# Patient Record
Sex: MALE | Race: WHITE | HISPANIC OR LATINO | Employment: UNEMPLOYED | URBAN - METROPOLITAN AREA
[De-identification: names, ages, dates, MRNs, and addresses within clinical notes are randomized per-mention and may not be internally consistent; named-entity substitution may affect disease eponyms.]

---

## 2018-07-19 ENCOUNTER — TRANSFERRED RECORDS (OUTPATIENT)
Dept: HEALTH INFORMATION MANAGEMENT | Facility: CLINIC | Age: 48
End: 2018-07-19

## 2018-08-22 ENCOUNTER — TRANSFERRED RECORDS (OUTPATIENT)
Dept: HEALTH INFORMATION MANAGEMENT | Facility: CLINIC | Age: 48
End: 2018-08-22

## 2018-08-28 ENCOUNTER — REFERRAL (OUTPATIENT)
Dept: TRANSPLANT | Facility: CLINIC | Age: 48
End: 2018-08-28

## 2018-08-28 NOTE — LETTER
Santos Galloway  [address] [address]    Dear Santos,    Thank you for your interest in the Transplant Center at [facility], [facility]. We look forward to being a part of your care team and assisting you through the transplant process.    As we discussed, your transplant coordinator is Shania Cuevas (Pancreas, Kidney).  You may call your coordinator at any time with questions or concerns.  Your first scheduled call will be on 11/19/18.  If this needs to change, call [phone].    Please complete the following.    1. Fill out and return the enclosed forms    Authorization for Electronic Communication    Authorization to Discuss Protected Health Information    Authorization for Release of Protected Health Information    2. Sign up for:    [employer], access to your electronic medical record (see enclosed pamphlet)    You can use these tools to learn more about your transplant, communicate with your care team, and track your medical details      Sincerely,    Solid Organ Transplant  [facility], [facility]

## 2018-11-07 VITALS — WEIGHT: 135 LBS | BODY MASS INDEX: 23.92 KG/M2 | HEIGHT: 63 IN

## 2018-11-07 NOTE — TELEPHONE ENCOUNTER
Insurance information:  Medical Assistance   Policy wagner:  Self  Subscriber/policy/ID number:  [id number]  Group Number:  [id number]    Is patient in a group home/assisted living? No   Does patient have a guardian? No    Referral intake process completed.  Patient is aware that after financial approval is received, medical records will be requested.   Patient confirmed for a callback from transplant coordinator on 11/19/2018  Tentative evaluation date NA.  Confirmed coordinator will discuss evaluation process in more detail at the time of their call.   Patient is aware of the need to arrange age appropriate cancer screening, vaccinations, and dental care.  Reminded patient to complete questionnaire, complete medical records release, and review packet prior to evaluation visit .  Assessed patient for special needs (ie--wheelchair, assistance, guardian, and ):  No  Patient instructed to call [phone] with questions.     JORDAN Jacob, LPN   Solid Organ Transplant

## 2018-11-19 ENCOUNTER — TELEPHONE (OUTPATIENT)
Dept: TRANSPLANT | Facility: CLINIC | Age: 48
End: 2018-11-19

## 2018-11-19 DIAGNOSIS — N18.9 CHRONIC RENAL FAILURE: ICD-10-CM

## 2018-11-19 DIAGNOSIS — Z76.82 ORGAN TRANSPLANT CANDIDATE: ICD-10-CM

## 2018-11-19 DIAGNOSIS — E11.9 DIABETES MELLITUS, TYPE 2 (H): ICD-10-CM

## 2018-11-19 DIAGNOSIS — I10 ESSENTIAL HYPERTENSION: ICD-10-CM

## 2018-11-19 NOTE — TELEPHONE ENCOUNTER
I placed a call to Santos to respond to his referral to our kidney transplant program.  His records indicate that he is Type 2 diabetic on insulin but Santos reports that he hasn't needed to take insulin for 2 years.  I informed him that he will likely need to take insulin again after transplant.  He thinks his diabetes was diagnosed at age 31-32.  He also has hypertension; has never been biopsied.  GFR in the 14 range and he states he is very fatigued and anemic, not on dialysis.  He states he is now disabled from his job.  No surgical history.  He states his 18 yr old son may want to be his donor.  I described the typical evaluation process.  He says his wife will likely come with him to evaluation.  He was unable to take down my name and number at this time. Orders to  for 1st day of evaluation.    Health maintenance:  He indicates he had coloscopy in 2017 but he can't recall why he had it (I do not see a report in [facility]).  Vaccinations; does not think he has had either Pneumovax or Hep B vaccines.  Dental; he states he hasn't seen a dentist in a very long time so I advised him to get exam/cleaning/work done.  He says this will likely be a financial hardship.

## 2018-11-20 ENCOUNTER — TRANSFERRED RECORDS (OUTPATIENT)
Dept: HEALTH INFORMATION MANAGEMENT | Facility: CLINIC | Age: 48
End: 2018-11-20

## 2018-11-20 NOTE — TELEPHONE ENCOUNTER
Called patient and offered dates next week last week of November and the 1st week of December, he choose Wed, Dec 5 slot 3 w/Rupa/Jonatan.....UPSing out to home today.

## 2018-12-03 NOTE — TELEPHONE ENCOUNTER
Patient called this morning asking to move his kidney eval to Friday, Dec 14 from Wed, Dec 5.  I told him we only do kidney evals on Mon, Wed & Thurs.  He was taking to someone else in Polish, he than asked for a Monday.  First available opening (with a non KP surgeon) is Mon, Jan 14, he talked again to the other party with him and finally confirmed for Mon, Jan 14.  I told patient I would be rescheduling his eval and mailing a revised schedule to his home, he confirmed.

## 2019-01-10 NOTE — PROGRESS NOTES
Assessment and Plan:  # Kidney transplant evaluation - patient is a good candidate overall. Benefits of a living donor transplant were discussed.  # CKD stage 5 from diabetes mellitus type 2 and hypertension - current eGFR 14-16 ml/min. He has uremic symptoms and is nearing the need for renal replacement therapy and would benefit from a kidney transplant.   # Type 2 diabetes - currently controlled without oral agents or insulin (insulin dc'd a few years ago). We discussed the potential need for insulin post-kidney transplant.   # CAD - no history of cardiac disease, but patient will be seen by Cardiology prior to transplant.  # PAD - mentioned on outside CT scans of the abdomen/pelvis dated 5+ years ago. Will repeat non-contrast abdomen/pelvic CT and order iliofemoral US with dopplers.   # Microscopic hematuria - 6-10 RBC's on outside urinalysis. Given age and lack of smoking history, more likely that this is related to his underlying diabetic nephropathy than bladder cancer. Repeat UA pending.   # Anxiety - currently taking sertraline and managed by his PCP. Appreciate social work in put.   # Health maintenance - due for dental.    Discussed the risks and benefits of a transplant, including the risk of surgery and immunosuppression medications.  Patient's overall evaluation will be discussed in the Transplant Program's regular meeting with a final recommendation on the patient's suitability for transplant to be made at that time.  Patient was seen in conjunction with Dr. Bert Garcia as part of a shared visit.    Patient was seen by myself, Dr. Bert Garcia, in conjunction with Khadijah Momin PA-C as part of a shared visit.    I personally reviewed past medical and surgical history, vital signs, medications and labs.  Present and past medical history, along with significant physical exam findings were all reviewed with DIAMANTE.    My lorenzo findings:  Santos Galloway is a 48 year old year old male with CKD from diabetes  mellitus type 2, who presents for kidney transplant evaluation.  Patient reports feeling okay overall, but with some medical complaints.    Key management decisions made by me and discussed with DIAMANTE:  # Kidney transplant evaluation: patient is a good candidate overall. Benefits of a living donor transplant were discussed.  # CKD from diabetes mellitus type 2: Patient has had a progressive decline in kidney function and is nearing need for renal replacement therapy and would benefit from a kidney transplant.  Preferably, a preemptive living donor kidney transplant.  # DM type 2: Good control off all medications at this time.  # Cardiac Risk: Patient will require Cardiology evaluation prior to transplant.  # PAD: Some atherosclerotic findings on outside imaging, but patient is asymptomatic.  Will obtain further imaging and discuss with Transplant Surgery.  # Anxiety: Appears stable, but appreciate  input.    Evaluation:  Santos Galloway was seen in consultation at the request of Dr. Geo Cheung for evaluation as a potential kidney transplant recipient.    Reason for Visit:  Santos Galloway is a 48-year-old male with CKD from diabetes mellitus type 2 and hypertension, who presents for kidney transplant evaluation.    HPI:  Mr. Galloway is a 48-year-old  male with CKD from presumed diabetes and hypertension. He was diagnosed with type 2 diabetes approximately 10-15 years ago that has also been complicated by gastroparesis, retinopathy, and peripheral neuropathy. He was previously on insulin (initiated in mid-2013), which was discontinued approximately 2 years ago (creatinine at time of discontinuation around 1.9-2.0 mg/dl, A1c's since around 5%). He is no longer checking blood sugars. He denies history of hypoglycemic unawareness. He began following with nephrology about 3 years ago when he was found to have serum creatinine in the mid-1's mg/dl, as well as proteinuria and microscopic hematuria. Serologic  work up was negative. Renal imaging showed echogenic kidneys. He has not had a kidney biopsy. He has continued to have a progressive decline in kidney function (creatinine 1.5-1.7 mg/dl [id number], 1.9-2.0 mg/dl November 2016 to May 2017, 3.3 mg/dl May 2018, now low-to-mid 4's mg/dl since July 2018). He had an AV fistula placed, but is not yet on dialysis.     He has no known history of cardiac disease or events, but does report a strong family history of coronary artery disease. In fact, his uncle is undergoing CABG today. He was previously physically active at work, but with uremic symptoms, he has had to stop working. He can walk several blocks and denies chest pain, SOB, or claudication symptoms.     Overall, he is feeling poorly. His energy level and appetite have been reduced over the past few months. He has intermittent nausea and vomiting. He estimates he has lost approximately 10 lbs due to decreased appetite. No diarrhea. He hasn't noticed any changes in urination, or dysuria, hematuria, or trouble emptying his bladder. No fever, sweats, or recent illness.         Kidney Disease Hx:        Kidney Disease Dx: DM/HTN       Biopsy Proven: No         On Dialysis: No       Primary Nephrologist: Dr. Moses         [facility] Hx:       h/o HTN: Yes         h/o DM:  Yes        h/o Protein in Urine: Yes        h/o Blood in Urine:  Yes        h/o Kidney Stones:  No       h/o UTI: No       h/o Chronic NSAID Use: No         Previous Transplant Hx:        No         Transplant Sensitization Hx:       Previous Tx: No       Blood Transfusion: No         Uremic Symptoms:       Fatigue: Yes; Cold: No; Nausea: Yes; Poor Appetite: Yes; Metallic Taste: No; Edema: No;         Cardiovascular Hx:       h/o Cardiac Issues: No       Exercise Tolerance: no chest pain or shortness of breath with exertion.         Health Maintenance:       Dental: Not up to date         Potential Donor(s): son     SANJU:  A comprehensive review of systems  "was obtained and negative, except as noted in the HPI or PMH.    PMH:   Medical record was reviewed and PMH was discussed with patient and noted below.  Past Medical History:   Diagnosis Date     Anxiety      Cataract      CKD (chronic kidney disease) stage 5, GFR less than 15 ml/min (H)      Gastroparesis      GERD (gastroesophageal reflux disease)      Glaucoma      Hypertension      Peripheral arterial disease (H)      Type 2 diabetes mellitus (H)        PSH:   Past Surgical History:   Procedure Laterality Date     CIRCUMCISION       CREATE FISTULA ARTERIOVENOUS UPPER EXTREMITY       Personal or family history of bleeding or anesthesia problems: No    Family Hx:  Family History   Problem Relation Age of Onset     Diabetes Mother      Kidney Disease Mother      Diabetes Maternal Grandmother      Diabetes Maternal Grandfather      Diabetes Paternal Grandmother      Coronary Artery Disease Maternal Uncle        Personal Hx:   Social History     Socioeconomic History     Marital status:      Spouse name: Not on file     Number of children: Not on file     Years of education: Not on file     Highest education level: Not on file   Social Needs     Financial resource strain: Not on file     Food insecurity - worry: Not on file     Food insecurity - inability: Not on file     Transportation needs - medical: Not on file     Transportation needs - non-medical: Not on file   Occupational History     Not on file   Tobacco Use     Smoking status: Never Smoker     Smokeless tobacco: Never Used   Substance and Sexual Activity     Alcohol use: No     Drug use: No     Sexual activity: Not on file   Other Topics Concern     Parent/sibling w/ CABG, MI or angioplasty before 65F 55M? Not Asked   Social History Narrative     Not on file       Allergies:  No Known Allergies    Medications:  Prior to Admission medications    Not on File       Vitals:  /66   Pulse 87   Temp 98.3  F (36.8  C) (Oral)   Ht 1.605 m (5' 3.18\") "   Wt 67.5 kg (148 lb 12.8 oz)   SpO2 99%   BMI 26.21 kg/m      Exam:  GENERAL APPEARANCE: alert and no distress  HENT: mouth without ulcers or lesions. Poor dentition  LYMPHATICS: no cervical or supraclavicular nodes  RESP: lungs clear to auscultation - no rales, rhonchi or wheezes  CV: regular rhythm, normal rate, no rub, no murmur  EDEMA: no LE edema bilaterally   ABDOMEN: soft, nondistended, nontender  MS: extremities normal - no gross deformities noted, no evidence of inflammation in joints, no muscle tenderness  SKIN: no rash  Femoral pulses 2+ equal bilaterally     Results:   Recent Results (from the past 336 hour(s))   EKG 12-lead, tracing only [EKG1]    Collection Time: 01/14/19  1:17 PM   Result Value Ref Range    Interpretation ECG Click View Image link to view waveform and result    UA with Microscopic    Collection Time: 01/14/19  1:57 PM   Result Value Ref Range    Color Urine Yellow     Appearance Urine Clear     Glucose Urine 50 (A) NEG^Negative mg/dL    Bilirubin Urine Negative NEG^Negative    Ketones Urine Negative NEG^Negative mg/dL    Specific Gravity Urine 1.010 1.003 - 1.035    Blood Urine Negative NEG^Negative    pH Urine 5.0 5.0 - 7.0 pH    Protein Albumin Urine >499 (A) NEG^Negative mg/dL    Urobilinogen mg/dL 0.0 0.0 - 2.0 mg/dL    Nitrite Urine Negative NEG^Negative    Leukocyte Esterase Urine Negative NEG^Negative    Source Midstream Urine     WBC Urine 1 0 - 5 /HPF    RBC Urine 2 0 - 2 /HPF    Squamous Epithelial /HPF Urine <1 0 - 1 /HPF    Mucous Urine Present (A) NEG^Negative /LPF   Comprehensive metabolic panel [LAB17]    Collection Time: 01/14/19  2:02 PM   Result Value Ref Range    Sodium 139 133 - 144 mmol/L    Potassium 4.3 3.4 - 5.3 mmol/L    Chloride 114 (H) 94 - 109 mmol/L    Carbon Dioxide 18 (L) 20 - 32 mmol/L    Anion Gap 8 3 - 14 mmol/L    Glucose 130 (H) 70 - 99 mg/dL    Urea Nitrogen 55 (H) 7 - 30 mg/dL    Creatinine 5.14 (H) 0.66 - 1.25 mg/dL    GFR Estimate 12 (L) >60  mL/min/[1.73_m2]    GFR Estimate If Black 14 (L) >60 mL/min/[1.73_m2]    Calcium 6.3 (L) 8.5 - 10.1 mg/dL    Bilirubin Total 0.2 0.2 - 1.3 mg/dL    Albumin 2.5 (L) 3.4 - 5.0 g/dL    Protein Total 5.6 (L) 6.8 - 8.8 g/dL    Alkaline Phosphatase 120 40 - 150 U/L    ALT 15 0 - 70 U/L    AST 11 0 - 45 U/L   CBC with platelets differential [CQA970]    Collection Time: 01/14/19  2:02 PM   Result Value Ref Range    WBC 6.7 4.0 - 11.0 10e9/L    RBC Count 3.66 (L) 4.4 - 5.9 10e12/L    Hemoglobin 9.8 (L) 13.3 - 17.7 g/dL    Hematocrit 32.2 (L) 40.0 - 53.0 %    MCV 88 78 - 100 fl    MCH 26.8 26.5 - 33.0 pg    MCHC 30.4 (L) 31.5 - 36.5 g/dL    RDW 14.4 10.0 - 15.0 %    Platelet Count 182 150 - 450 10e9/L    Diff Method Automated Method     % Neutrophils 73.3 %    % Lymphocytes 18.8 %    % Monocytes 4.8 %    % Eosinophils 2.3 %    % Basophils 0.6 %    % Immature Granulocytes 0.2 %    Nucleated RBCs 0 0 /100    Absolute Neutrophil 4.9 1.6 - 8.3 10e9/L    Absolute Lymphocytes 1.3 0.8 - 5.3 10e9/L    Absolute Monocytes 0.3 0.0 - 1.3 10e9/L    Absolute Eosinophils 0.2 0.0 - 0.7 10e9/L    Absolute Basophils 0.0 0.0 - 0.2 10e9/L    Abs Immature Granulocytes 0.0 0 - 0.4 10e9/L    Absolute Nucleated RBC 0.0    INR [OPI6626]    Collection Time: 01/14/19  2:02 PM   Result Value Ref Range    INR 1.03 0.86 - 1.14   Partial thromboplastin time [LAB56]    Collection Time: 01/14/19  2:02 PM   Result Value Ref Range    PTT 28 22 - 37 sec   Hemoglobin A1c [LAB90]    Collection Time: 01/14/19  2:02 PM   Result Value Ref Range    Hemoglobin A1C 5.0 0 - 5.6 %

## 2019-01-14 ENCOUNTER — OFFICE VISIT (OUTPATIENT)
Dept: TRANSPLANT | Facility: CLINIC | Age: 49
End: 2019-01-14
Attending: PHYSICIAN ASSISTANT
Payer: COMMERCIAL

## 2019-01-14 ENCOUNTER — ALLIED HEALTH/NURSE VISIT (OUTPATIENT)
Dept: RESEARCH | Facility: CLINIC | Age: 49
End: 2019-01-14

## 2019-01-14 ENCOUNTER — APPOINTMENT (OUTPATIENT)
Dept: LAB | Facility: CLINIC | Age: 49
End: 2019-01-14
Payer: COMMERCIAL

## 2019-01-14 ENCOUNTER — DOCUMENTATION ONLY (OUTPATIENT)
Dept: TRANSPLANT | Facility: CLINIC | Age: 49
End: 2019-01-14

## 2019-01-14 ENCOUNTER — ANCILLARY PROCEDURE (OUTPATIENT)
Dept: GENERAL RADIOLOGY | Facility: CLINIC | Age: 49
End: 2019-01-14
Attending: PHYSICIAN ASSISTANT
Payer: COMMERCIAL

## 2019-01-14 ENCOUNTER — ANCILLARY PROCEDURE (OUTPATIENT)
Dept: CARDIOLOGY | Facility: CLINIC | Age: 49
End: 2019-01-14
Attending: PHYSICIAN ASSISTANT
Payer: COMMERCIAL

## 2019-01-14 VITALS
OXYGEN SATURATION: 99 % | HEIGHT: 63 IN | WEIGHT: 148.8 LBS | HEART RATE: 87 BPM | SYSTOLIC BLOOD PRESSURE: 132 MMHG | TEMPERATURE: 98.3 F | DIASTOLIC BLOOD PRESSURE: 66 MMHG | BODY MASS INDEX: 26.36 KG/M2

## 2019-01-14 DIAGNOSIS — Z76.82 ORGAN TRANSPLANT CANDIDATE: ICD-10-CM

## 2019-01-14 DIAGNOSIS — E11.9 DIABETES MELLITUS, TYPE 2 (H): ICD-10-CM

## 2019-01-14 DIAGNOSIS — Z00.6 EXAMINATION OF PARTICIPANT IN CLINICAL TRIAL: Primary | ICD-10-CM

## 2019-01-14 DIAGNOSIS — Z76.82 ORGAN TRANSPLANT CANDIDATE: Primary | ICD-10-CM

## 2019-01-14 DIAGNOSIS — I10 ESSENTIAL HYPERTENSION: ICD-10-CM

## 2019-01-14 DIAGNOSIS — N18.9 CHRONIC RENAL FAILURE: ICD-10-CM

## 2019-01-14 DIAGNOSIS — N18.5 CKD (CHRONIC KIDNEY DISEASE) STAGE 5, GFR LESS THAN 15 ML/MIN (H): Primary | ICD-10-CM

## 2019-01-14 DIAGNOSIS — N18.5 CKD (CHRONIC KIDNEY DISEASE) STAGE 5, GFR LESS THAN 15 ML/MIN (H): ICD-10-CM

## 2019-01-14 DIAGNOSIS — N18.5 TYPE 2 DIABETES MELLITUS WITH STAGE 5 CHRONIC KIDNEY DISEASE NOT ON CHRONIC DIALYSIS, WITHOUT LONG-TERM CURRENT USE OF INSULIN (H): ICD-10-CM

## 2019-01-14 DIAGNOSIS — F41.9 ANXIETY: ICD-10-CM

## 2019-01-14 DIAGNOSIS — E11.22 TYPE 2 DIABETES MELLITUS WITH STAGE 5 CHRONIC KIDNEY DISEASE NOT ON CHRONIC DIALYSIS, WITHOUT LONG-TERM CURRENT USE OF INSULIN (H): ICD-10-CM

## 2019-01-14 DIAGNOSIS — I73.9 PERIPHERAL ARTERIAL DISEASE (H): ICD-10-CM

## 2019-01-14 LAB
ABO + RH BLD: NORMAL
ALBUMIN SERPL-MCNC: 2.5 G/DL (ref 3.4–5)
ALBUMIN UR-MCNC: >499 MG/DL
ALP SERPL-CCNC: 120 U/L (ref 40–150)
ALT SERPL W P-5'-P-CCNC: 15 U/L (ref 0–70)
ANION GAP SERPL CALCULATED.3IONS-SCNC: 8 MMOL/L (ref 3–14)
APPEARANCE UR: CLEAR
APTT PPP: 28 SEC (ref 22–37)
AST SERPL W P-5'-P-CCNC: 11 U/L (ref 0–45)
BASOPHILS # BLD AUTO: 0 10E9/L (ref 0–0.2)
BASOPHILS NFR BLD AUTO: 0.6 %
BILIRUB SERPL-MCNC: 0.2 MG/DL (ref 0.2–1.3)
BILIRUB UR QL STRIP: NEGATIVE
BLOOD BANK CMNT PATIENT-IMP: NORMAL
BLOOD BANK CMNT PATIENT-IMP: NORMAL
BUN SERPL-MCNC: 55 MG/DL (ref 7–30)
CALCIUM SERPL-MCNC: 6.3 MG/DL (ref 8.5–10.1)
CHLORIDE SERPL-SCNC: 114 MMOL/L (ref 94–109)
CO2 SERPL-SCNC: 18 MMOL/L (ref 20–32)
COLOR UR AUTO: YELLOW
CREAT SERPL-MCNC: 5.14 MG/DL (ref 0.66–1.25)
DIFFERENTIAL METHOD BLD: ABNORMAL
EOSINOPHIL # BLD AUTO: 0.2 10E9/L (ref 0–0.7)
EOSINOPHIL NFR BLD AUTO: 2.3 %
ERYTHROCYTE [DISTWIDTH] IN BLOOD BY AUTOMATED COUNT: 14.4 % (ref 10–15)
GFR SERPL CREATININE-BSD FRML MDRD: 12 ML/MIN/{1.73_M2}
GLUCOSE SERPL-MCNC: 130 MG/DL (ref 70–99)
GLUCOSE UR STRIP-MCNC: 50 MG/DL
HBA1C MFR BLD: 5 % (ref 0–5.6)
HCT VFR BLD AUTO: 32.2 % (ref 40–53)
HGB BLD-MCNC: 9.8 G/DL (ref 13.3–17.7)
HGB UR QL STRIP: NEGATIVE
IMM GRANULOCYTES # BLD: 0 10E9/L (ref 0–0.4)
IMM GRANULOCYTES NFR BLD: 0.2 %
INR PPP: 1.03 (ref 0.86–1.14)
KETONES UR STRIP-MCNC: NEGATIVE MG/DL
LEUKOCYTE ESTERASE UR QL STRIP: NEGATIVE
LYMPHOCYTES # BLD AUTO: 1.3 10E9/L (ref 0.8–5.3)
LYMPHOCYTES NFR BLD AUTO: 18.8 %
MCH RBC QN AUTO: 26.8 PG (ref 26.5–33)
MCHC RBC AUTO-ENTMCNC: 30.4 G/DL (ref 31.5–36.5)
MCV RBC AUTO: 88 FL (ref 78–100)
MONOCYTES # BLD AUTO: 0.3 10E9/L (ref 0–1.3)
MONOCYTES NFR BLD AUTO: 4.8 %
MUCOUS THREADS #/AREA URNS LPF: PRESENT /LPF
NEUTROPHILS # BLD AUTO: 4.9 10E9/L (ref 1.6–8.3)
NEUTROPHILS NFR BLD AUTO: 73.3 %
NITRATE UR QL: NEGATIVE
NRBC # BLD AUTO: 0 10*3/UL
NRBC BLD AUTO-RTO: 0 /100
PH UR STRIP: 5 PH (ref 5–7)
PLATELET # BLD AUTO: 182 10E9/L (ref 150–450)
POTASSIUM SERPL-SCNC: 4.3 MMOL/L (ref 3.4–5.3)
PROT SERPL-MCNC: 5.6 G/DL (ref 6.8–8.8)
RBC # BLD AUTO: 3.66 10E12/L (ref 4.4–5.9)
RBC #/AREA URNS AUTO: 2 /HPF (ref 0–2)
SODIUM SERPL-SCNC: 139 MMOL/L (ref 133–144)
SOURCE: ABNORMAL
SP GR UR STRIP: 1.01 (ref 1–1.03)
SPECIMEN EXP DATE BLD: NORMAL
SPECIMEN EXP DATE BLD: NORMAL
SQUAMOUS #/AREA URNS AUTO: <1 /HPF (ref 0–1)
UROBILINOGEN UR STRIP-MCNC: 0 MG/DL (ref 0–2)
WBC # BLD AUTO: 6.7 10E9/L (ref 4–11)
WBC #/AREA URNS AUTO: 1 /HPF (ref 0–5)

## 2019-01-14 PROCEDURE — 83036 HEMOGLOBIN GLYCOSYLATED A1C: CPT | Performed by: PHYSICIAN ASSISTANT

## 2019-01-14 PROCEDURE — 84681 ASSAY OF C-PEPTIDE: CPT | Performed by: PHYSICIAN ASSISTANT

## 2019-01-14 PROCEDURE — 86803 HEPATITIS C AB TEST: CPT | Performed by: PHYSICIAN ASSISTANT

## 2019-01-14 PROCEDURE — 81241 F5 GENE: CPT | Performed by: PHYSICIAN ASSISTANT

## 2019-01-14 PROCEDURE — 86644 CMV ANTIBODY: CPT | Performed by: PHYSICIAN ASSISTANT

## 2019-01-14 PROCEDURE — 80053 COMPREHEN METABOLIC PANEL: CPT | Performed by: PHYSICIAN ASSISTANT

## 2019-01-14 PROCEDURE — 86850 RBC ANTIBODY SCREEN: CPT

## 2019-01-14 PROCEDURE — 85610 PROTHROMBIN TIME: CPT | Performed by: PHYSICIAN ASSISTANT

## 2019-01-14 PROCEDURE — 86147 CARDIOLIPIN ANTIBODY EA IG: CPT | Performed by: PHYSICIAN ASSISTANT

## 2019-01-14 PROCEDURE — 86900 BLOOD TYPING SEROLOGIC ABO: CPT | Performed by: PHYSICIAN ASSISTANT

## 2019-01-14 PROCEDURE — G0463 HOSPITAL OUTPT CLINIC VISIT: HCPCS | Mod: ZF

## 2019-01-14 PROCEDURE — 86480 TB TEST CELL IMMUN MEASURE: CPT | Performed by: PHYSICIAN ASSISTANT

## 2019-01-14 PROCEDURE — 86886 COOMBS TEST INDIRECT TITER: CPT | Performed by: PHYSICIAN ASSISTANT

## 2019-01-14 PROCEDURE — 85730 THROMBOPLASTIN TIME PARTIAL: CPT | Performed by: PHYSICIAN ASSISTANT

## 2019-01-14 PROCEDURE — 85670 THROMBIN TIME PLASMA: CPT | Performed by: PHYSICIAN ASSISTANT

## 2019-01-14 PROCEDURE — 85613 RUSSELL VIPER VENOM DILUTED: CPT | Performed by: PHYSICIAN ASSISTANT

## 2019-01-14 PROCEDURE — 85025 COMPLETE CBC W/AUTO DIFF WBC: CPT | Performed by: PHYSICIAN ASSISTANT

## 2019-01-14 PROCEDURE — 40000866 ZZHCL STATISTIC HIV 1/2 ANTIGEN/ANTIBODY PRETRANSPLANT ONLY: Performed by: PHYSICIAN ASSISTANT

## 2019-01-14 PROCEDURE — 86780 TREPONEMA PALLIDUM: CPT | Performed by: PHYSICIAN ASSISTANT

## 2019-01-14 PROCEDURE — 86704 HEP B CORE ANTIBODY TOTAL: CPT | Performed by: PHYSICIAN ASSISTANT

## 2019-01-14 PROCEDURE — 36415 COLL VENOUS BLD VENIPUNCTURE: CPT | Performed by: PHYSICIAN ASSISTANT

## 2019-01-14 PROCEDURE — 86787 VARICELLA-ZOSTER ANTIBODY: CPT | Performed by: PHYSICIAN ASSISTANT

## 2019-01-14 PROCEDURE — 81001 URINALYSIS AUTO W/SCOPE: CPT | Performed by: PHYSICIAN ASSISTANT

## 2019-01-14 PROCEDURE — 86905 BLOOD TYPING RBC ANTIGENS: CPT | Performed by: PHYSICIAN ASSISTANT

## 2019-01-14 PROCEDURE — 86665 EPSTEIN-BARR CAPSID VCA: CPT | Performed by: PHYSICIAN ASSISTANT

## 2019-01-14 PROCEDURE — 81240 F2 GENE: CPT | Performed by: PHYSICIAN ASSISTANT

## 2019-01-14 PROCEDURE — 87340 HEPATITIS B SURFACE AG IA: CPT | Performed by: PHYSICIAN ASSISTANT

## 2019-01-14 PROCEDURE — 86706 HEP B SURFACE ANTIBODY: CPT | Performed by: PHYSICIAN ASSISTANT

## 2019-01-14 RX ORDER — LISINOPRIL 20 MG/1
TABLET ORAL
COMMUNITY
Start: 2018-12-18

## 2019-01-14 RX ORDER — CALCITRIOL 0.25 UG/1
CAPSULE, LIQUID FILLED ORAL
COMMUNITY
Start: 2018-11-25

## 2019-01-14 RX ORDER — TORSEMIDE 10 MG/1
10 TABLET ORAL
COMMUNITY
Start: 2018-05-07

## 2019-01-14 RX ORDER — HYDRALAZINE HYDROCHLORIDE 50 MG/1
50 TABLET, FILM COATED ORAL
COMMUNITY
Start: 2018-12-17

## 2019-01-14 RX ORDER — CALCIUM CARBONATE 500 MG/1
TABLET, CHEWABLE ORAL
COMMUNITY
Start: 2016-11-14

## 2019-01-14 RX ORDER — SIMVASTATIN 20 MG
20 TABLET ORAL
COMMUNITY
Start: 2018-12-17

## 2019-01-14 RX ORDER — LANOLIN ALCOHOL/MO/W.PET/CERES
1000 CREAM (GRAM) TOPICAL
COMMUNITY
Start: 2015-11-25

## 2019-01-14 RX ORDER — SERTRALINE HYDROCHLORIDE 25 MG/1
TABLET, FILM COATED ORAL
COMMUNITY
Start: 2018-12-17

## 2019-01-14 ASSESSMENT — MIFFLIN-ST. JEOR: SCORE: 1452.93

## 2019-01-14 ASSESSMENT — PAIN SCALES - GENERAL: PAINLEVEL: NO PAIN (0)

## 2019-01-14 NOTE — PROGRESS NOTES
"Kidney Transplant Referral - 8/15/2018  Santos Galloway attended the pre-transplant patient education class today. The [facility] website pre-transplant modules were viewed; class participants were educated on using the site.     Content reviewed:    Living Donation and how to access that program    Paired exchange    Kidney Donor Profile Index (KDPI)    Waiting list issues (right to decline without penalty, high PHS risk donors, what to expect when called with an offer)    Hospital experience,  length of stay , need to stay locally post-discharge (2-4 weeks)    Surgical options (with pictures)                             Post-surgery lifting and driving restrictions    Post-transplant routines, frequency of lab work and clinic visits    Need to stay locally post-discharge (2-4 weeks)    Role of Transplant Coordinator    Participants were informed of the benefits of transplant as well as potential risks such as infection, cancer, and death.  The need for total adherence with immunosuppression medications and following transplant regimens was stressed.  The overall evaluation/approval/listing process was reviewed.        The patient was provided with the following documents:  What You Need to Know About a Kidney Transplant  Adult Kidney Transplant - A Guide for Patients  SRTR Data Sheet - Kidney  Brochure - Kidney Allocation  Brochure - Multiple Listing and Waiting Time Transfer  What Every Patient Needs to Know (UNOS)  UNOS Facts and Figures  Finding a Donor  [facility] - Quick Start Guide  KDPI Consent  Receipt of Information form    Santos Galloway signed the  Receipt of Information for Organ Transplant Recipient.\" He was provided Shania Cuevas's business card and instructed to call with additional questions.      Summary    Team s concerns/comments:   Patient will need Cardiac Clearance and vascular studies due to hx of diabetes.    Candidacy category: Green    Action/Plan:   1) Cardiology consult " ordered-need to be scheduled  2) Abd CT, iliac US doppler ordered-need to be scheduled    Expected Selection Meeting Discussion: 1/23/19

## 2019-01-14 NOTE — PROGRESS NOTES
Surgery Clinical Trials Office Notification of Study Eligibility  Study Name: Randomized Controlled Trial to Evaluate the Effect of Lost Wage Reimbursement to Potential Kidney  Donors On Living Donation Rates (Donor Lost Wages Study) (IRB #[id number])    ICF Version Date / IRB Approval Date: 11-AUG-2018/ 21-AUG-2018    Date of Approach/Consent: 14-JAN-2019  Santos was approached in clinic regarding eligibility for the Donor Lost Wages study but he declined to participate.

## 2019-01-14 NOTE — LETTER
1/14/2019    RE: Santos Galloway  [address] [facility] [address]       Assessment and Plan:  # Kidney transplant evaluation - patient is a good candidate overall. Benefits of a living donor transplant were discussed.  # CKD stage 5 from diabetes mellitus type 2 and hypertension - current eGFR 14-16 ml/min. He has uremic symptoms and is nearing the need for renal replacement therapy and would benefit from a kidney transplant.   # Type 2 diabetes - currently controlled without oral agents or insulin (insulin dc'd a few years ago). We discussed the potential need for insulin post-kidney transplant.   # CAD - no history of cardiac disease, but patient will be seen by Cardiology prior to transplant.  # PAD - mentioned on outside CT scans of the abdomen/pelvis dated 5+ years ago. Will repeat non-contrast abdomen/pelvic CT and order iliofemoral US with dopplers.   # Microscopic hematuria - 6-10 RBC's on outside urinalysis. Given age and lack of smoking history, more likely that this is related to his underlying diabetic nephropathy than bladder cancer. Repeat UA pending.   # Anxiety - currently taking sertraline and managed by his PCP. Appreciate social work in put.   # Health maintenance - due for dental.    Discussed the risks and benefits of a transplant, including the risk of surgery and immunosuppression medications.  Patient's overall evaluation will be discussed in the Transplant Program's regular meeting with a final recommendation on the patient's suitability for transplant to be made at that time.  Patient was seen in conjunction with Dr. Bert Garcia as part of a shared visit.    Patient was seen by myself, Dr. Bert Garcia, in conjunction with Khadijah Momin PA-C as part of a shared visit.    I personally reviewed past medical and surgical history, vital signs, medications and labs.  Present and past medical history, along with significant physical exam findings were all reviewed with DIAMANTE.    My key  findings:  Santos Galloway is a 48 year old year old male with CKD from diabetes mellitus type 2, who presents for kidney transplant evaluation.  Patient reports feeling okay overall, but with some medical complaints.    Key management decisions made by me and discussed with DIAMANTE:  # Kidney transplant evaluation: patient is a good candidate overall. Benefits of a living donor transplant were discussed.  # CKD from diabetes mellitus type 2: Patient has had a progressive decline in kidney function and is nearing need for renal replacement therapy and would benefit from a kidney transplant.  Preferably, a preemptive living donor kidney transplant.  # DM type 2: Good control off all medications at this time.  # Cardiac Risk: Patient will require Cardiology evaluation prior to transplant.  # PAD: Some atherosclerotic findings on outside imaging, but patient is asymptomatic.  Will obtain further imaging and discuss with Transplant Surgery.  # Anxiety: Appears stable, but appreciate  input.    Evaluation:  Santos Galloway was seen in consultation at the request of Dr. Geo Cheung for evaluation as a potential kidney transplant recipient.    Reason for Visit:  Santos Galloway is a 48-year-old male with CKD from diabetes mellitus type 2 and hypertension, who presents for kidney transplant evaluation.    HPI:  Mr. Galloway is a 48-year-old  male with CKD from presumed diabetes and hypertension. He was diagnosed with type 2 diabetes approximately 10-15 years ago that has also been complicated by gastroparesis, retinopathy, and peripheral neuropathy. He was previously on insulin (initiated in mid-2013), which was discontinued approximately 2 years ago (creatinine at time of discontinuation around 1.9-2.0 mg/dl, A1c's since around 5%). He is no longer checking blood sugars. He denies history of hypoglycemic unawareness. He began following with nephrology about 3 years ago when he was found to have serum creatinine in the  mid-1's mg/dl, as well as proteinuria and microscopic hematuria. Serologic work up was negative. Renal imaging showed echogenic kidneys. He has not had a kidney biopsy. He has continued to have a progressive decline in kidney function (creatinine 1.5-1.7 mg/dl 9572-0135, 1.9-2.0 mg/dl November 2016 to May 2017, 3.3 mg/dl May 2018, now low-to-mid 4's mg/dl since July 2018). He had an AV fistula placed, but is not yet on dialysis.     He has no known history of cardiac disease or events, but does report a strong family history of coronary artery disease. In fact, his uncle is undergoing CABG today. He was previously physically active at work, but with uremic symptoms, he has had to stop working. He can walk several blocks and denies chest pain, SOB, or claudication symptoms.     Overall, he is feeling poorly. His energy level and appetite have been reduced over the past few months. He has intermittent nausea and vomiting. He estimates he has lost approximately 10 lbs due to decreased appetite. No diarrhea. He hasn't noticed any changes in urination, or dysuria, hematuria, or trouble emptying his bladder. No fever, sweats, or recent illness.         Kidney Disease Hx:        Kidney Disease Dx: DM/HTN       Biopsy Proven: No         On Dialysis: No       Primary Nephrologist: Dr. Aurelia Acosta Hx:       h/o HTN: Yes         h/o DM:  Yes        h/o Protein in Urine: Yes        h/o Blood in Urine:  Yes        h/o Kidney Stones:  No       h/o UTI: No       h/o Chronic NSAID Use: No         Previous Transplant Hx:        No         Transplant Sensitization Hx:       Previous Tx: No       Blood Transfusion: No         Uremic Symptoms:       Fatigue: Yes; Cold: No; Nausea: Yes; Poor Appetite: Yes; Metallic Taste: No; Edema: No;         Cardiovascular Hx:       h/o Cardiac Issues: No       Exercise Tolerance: no chest pain or shortness of breath with exertion.         Health Maintenance:       Dental: Not up to date          Potential Donor(s): son     ROS:  A comprehensive review of systems was obtained and negative, except as noted in the HPI or PMH.    PMH:   Medical record was reviewed and PMH was discussed with patient and noted below.  Past Medical History:   Diagnosis Date     Anxiety      Cataract      CKD (chronic kidney disease) stage 5, GFR less than 15 ml/min (H)      Gastroparesis      GERD (gastroesophageal reflux disease)      Glaucoma      Hypertension      Peripheral arterial disease (H)      Type 2 diabetes mellitus (H)        PSH:   Past Surgical History:   Procedure Laterality Date     CIRCUMCISION       CREATE FISTULA ARTERIOVENOUS UPPER EXTREMITY       Personal or family history of bleeding or anesthesia problems: No    Family Hx:  Family History   Problem Relation Age of Onset     Diabetes Mother      Kidney Disease Mother      Diabetes Maternal Grandmother      Diabetes Maternal Grandfather      Diabetes Paternal Grandmother      Coronary Artery Disease Maternal Uncle        Personal Hx:   Social History     Socioeconomic History     Marital status:      Spouse name: Not on file     Number of children: Not on file     Years of education: Not on file     Highest education level: Not on file   Social Needs     Financial resource strain: Not on file     Food insecurity - worry: Not on file     Food insecurity - inability: Not on file     Transportation needs - medical: Not on file     Transportation needs - non-medical: Not on file   Occupational History     Not on file   Tobacco Use     Smoking status: Never Smoker     Smokeless tobacco: Never Used   Substance and Sexual Activity     Alcohol use: No     Drug use: No     Sexual activity: Not on file   Other Topics Concern     Parent/sibling w/ CABG, MI or angioplasty before 65F 55M? Not Asked   Social History Narrative     Not on file       Allergies:  No Known Allergies    Medications:  Prior to Admission medications    Not on File       Vitals:  BP  "132/66   Pulse 87   Temp 98.3  F (36.8  C) (Oral)   Ht 1.605 m (5' 3.18\")   Wt 67.5 kg (148 lb 12.8 oz)   SpO2 99%   BMI 26.21 kg/m       Exam:  GENERAL APPEARANCE: alert and no distress  HENT: mouth without ulcers or lesions. Poor dentition  LYMPHATICS: no cervical or supraclavicular nodes  RESP: lungs clear to auscultation - no rales, rhonchi or wheezes  CV: regular rhythm, normal rate, no rub, no murmur  EDEMA: no LE edema bilaterally   ABDOMEN: soft, nondistended, nontender  MS: extremities normal - no gross deformities noted, no evidence of inflammation in joints, no muscle tenderness  SKIN: no rash  Femoral pulses 2+ equal bilaterally     Results:   Recent Results (from the past 336 hour(s))   EKG 12-lead, tracing only [EKG1]    Collection Time: 01/14/19  1:17 PM   Result Value Ref Range    Interpretation ECG Click View Image link to view waveform and result    UA with Microscopic    Collection Time: 01/14/19  1:57 PM   Result Value Ref Range    Color Urine Yellow     Appearance Urine Clear     Glucose Urine 50 (A) NEG^Negative mg/dL    Bilirubin Urine Negative NEG^Negative    Ketones Urine Negative NEG^Negative mg/dL    Specific Gravity Urine 1.010 1.003 - 1.035    Blood Urine Negative NEG^Negative    pH Urine 5.0 5.0 - 7.0 pH    Protein Albumin Urine >499 (A) NEG^Negative mg/dL    Urobilinogen mg/dL 0.0 0.0 - 2.0 mg/dL    Nitrite Urine Negative NEG^Negative    Leukocyte Esterase Urine Negative NEG^Negative    Source Midstream Urine     WBC Urine 1 0 - 5 /HPF    RBC Urine 2 0 - 2 /HPF    Squamous Epithelial /HPF Urine <1 0 - 1 /HPF    Mucous Urine Present (A) NEG^Negative /LPF   Comprehensive metabolic panel [LAB17]    Collection Time: 01/14/19  2:02 PM   Result Value Ref Range    Sodium 139 133 - 144 mmol/L    Potassium 4.3 3.4 - 5.3 mmol/L    Chloride 114 (H) 94 - 109 mmol/L    Carbon Dioxide 18 (L) 20 - 32 mmol/L    Anion Gap 8 3 - 14 mmol/L    Glucose 130 (H) 70 - 99 mg/dL    Urea Nitrogen 55 (H) 7 " - 30 mg/dL    Creatinine 5.14 (H) 0.66 - 1.25 mg/dL    GFR Estimate 12 (L) >60 mL/min/[1.73_m2]    GFR Estimate If Black 14 (L) >60 mL/min/[1.73_m2]    Calcium 6.3 (L) 8.5 - 10.1 mg/dL    Bilirubin Total 0.2 0.2 - 1.3 mg/dL    Albumin 2.5 (L) 3.4 - 5.0 g/dL    Protein Total 5.6 (L) 6.8 - 8.8 g/dL    Alkaline Phosphatase 120 40 - 150 U/L    ALT 15 0 - 70 U/L    AST 11 0 - 45 U/L   CBC with platelets differential [OPE924]    Collection Time: 01/14/19  2:02 PM   Result Value Ref Range    WBC 6.7 4.0 - 11.0 10e9/L    RBC Count 3.66 (L) 4.4 - 5.9 10e12/L    Hemoglobin 9.8 (L) 13.3 - 17.7 g/dL    Hematocrit 32.2 (L) 40.0 - 53.0 %    MCV 88 78 - 100 fl    MCH 26.8 26.5 - 33.0 pg    MCHC 30.4 (L) 31.5 - 36.5 g/dL    RDW 14.4 10.0 - 15.0 %    Platelet Count 182 150 - 450 10e9/L    Diff Method Automated Method     % Neutrophils 73.3 %    % Lymphocytes 18.8 %    % Monocytes 4.8 %    % Eosinophils 2.3 %    % Basophils 0.6 %    % Immature Granulocytes 0.2 %    Nucleated RBCs 0 0 /100    Absolute Neutrophil 4.9 1.6 - 8.3 10e9/L    Absolute Lymphocytes 1.3 0.8 - 5.3 10e9/L    Absolute Monocytes 0.3 0.0 - 1.3 10e9/L    Absolute Eosinophils 0.2 0.0 - 0.7 10e9/L    Absolute Basophils 0.0 0.0 - 0.2 10e9/L    Abs Immature Granulocytes 0.0 0 - 0.4 10e9/L    Absolute Nucleated RBC 0.0    INR [SCX2881]    Collection Time: 01/14/19  2:02 PM   Result Value Ref Range    INR 1.03 0.86 - 1.14   Partial thromboplastin time [LAB56]    Collection Time: 01/14/19  2:02 PM   Result Value Ref Range    PTT 28 22 - 37 sec   Hemoglobin A1c [LAB90]    Collection Time: 01/14/19  2:02 PM   Result Value Ref Range    Hemoglobin A1C 5.0 0 - 5.6 %         PKE

## 2019-01-14 NOTE — NURSING NOTE
"Chief Complaint   Patient presents with     Transplant Evaluation     pre kidney eval     Blood pressure 132/66, pulse 87, temperature 98.3  F (36.8  C), temperature source Oral, height 1.605 m (5' 3.18\"), weight 67.5 kg (148 lb 12.8 oz), SpO2 99 %.    KRISTYN MANJARREZ CMA    "

## 2019-01-14 NOTE — LETTER
2019       RE: Santos Galloway  [address] [facility] [address]     Dear Colleague,    Thank you for referring your patient, Santos Galloway, to the [facility]ID [facility]GAN TRANSPL[facility] at [facility]. Please see a copy of my visit note below.    RE: Santos Galloway    [facility]# [id number]        I saw your patient, Santos Galloway, in consultation in our pretransplant clinic.  He was at clinic to discuss care for his end-stage renal disease.  Prior to clinic, he attended our pretransplant class.       We talked about the pros and cons of transplantation vs. dialysis.  We discussed the fact that it was important that he think about the pros and cons of each treatment option and make an active decision.  We also discussed the fact that the two were interconnected and he may need to go on dialysis before transplant (if he chose to have a transplant) and that if the transplant failed, he might need dialysis before another transplant.       We also discussed the fact that if he chose to have a transplant, he would need to decide between going on the wait list for a  donor transplant vs. having a living donor transplant.  We talked about the pros and cons of each option.  Although I didn't express an opinion regarding transplantation or dialysis, I suggested that if he chose to have a transplant, a living donor transplant would be preferable in that the surgery is the same, the immunosuppressive drugs and the risks are the same, but the transplant could be done sooner and the results are better.  I told him that the wait for  donor kidney was approximately five years for patients who are newly put on the waiting list.  In addition, we talked about the fact that the disadvantage of a living donor transplant was the risk to the donor.       Of note, his son is interested in donating. His son is 18 and they do have a family history of type II diabetes. I suggested that he look to see  whether other family members (his siblings that do not have diabetes) or friends that might be potential donors.  Because he was interested in living donation, we spent some time discussing donor risks, including the risks of mortality, morbidity, and long-term risks with a single kidney. I also provided him with our donor DVD to view and share at his leisure.     I attempted to answer any remaining questions.  I also told him that should he have any questions, he should feel free to contact us.  We would be glad to answer any questions either over the phone or at another clinic visit.  His transplant coordinator is Shania Cuevas and may be reached at [phone].  Thank you for the opportunity to see him.     I spent 20 minutes with this patient.  Over 90% of that time was spent in counseling and coordination of care.             Yours truly,               Geo Cheung MD         Professor of Surgery         ([phone])    AJM/st    Again, thank you for allowing me to participate in the care of your patient.      Sincerely,    GRAEME

## 2019-01-14 NOTE — PROGRESS NOTES
Outpatient MNT: Kidney Transplant Evaluation    Current BMI: 26.2 (HT 63 in,  lbs/68 kg)  BMI is within criteria of <35 for kidney transplant     Time Spent: 15 minutes  Visit Type: Initial  Referring Physician: Dr Cheung  Pt accompanied by: self    History of previous txp: none  Dialysis: no    Nutrition Assessment  Pt cooks for self w/o added salt.     Appetite: reduced ~1 year, pt unsure why     Vitamins, Supplements, Pertinent Meds: B12  Herbal Medicines/Supplements: none     Diet Recall  Breakfast None    Lunch Brown rice with fish, broccoli, corn    Dinner Chicken or fish and veggies, occasional double boiled potato; ramen with only part of seasoning     Snacks None    Beverages Water, 24 oz Diet Dr Pepper, juice (not daily)   Alcohol None    Dining out 1x/month      Physical Activity  None      Anthropometrics  Height:   63 in   BMI:    26.2    Weight Status:Overweight BMI 25-29.9   Weight:  148 lbs            IBW (lb): 124  % IBW: 119    Wt Hx: No edema nor major weight changes per pt report.     Adj/dosing BW: 148 lbs/68 kg       Labs  No results found for: POTASSIUM  PHOSPHORUS: no recent level on file    Malnutrition  % Intake: Decreased intake does not meet criteria for malnutrition   % Weight Loss: None noted  Subcutaneous Fat Loss: None  Muscle Loss: None  Fluid Accumulation/Edema: None noted  Malnutrition Diagnosis: Patient does not meet two of the above criteria necessary for diagnosing malnutrition     Estimated Nutrition Needs  Energy  1870-6579     (25-30 kcal/kg for maintenance)     Protein  41-54    (0.6-0.8 g/kg for CKD)           Fluid  1 ml/kcal or per MD   Micronutrient   Na+: <2000 mg/day  K+: 6405-6238 mg/day  Phos: 800-1000 mg/day            Nutrition Diagnosis  Food and nutrition related knowledge deficit r/t pre kidney transplant eval AEB pt verbalized not hearing pre/post transplant diet guidelines.    Nutrition Intervention  Nutrition education provided:  Discussed sodium intake  (low sodium foods and drinks, seasoning food without salt and tips for low sodium diet). Reviewed that if phos level increases, should switch to clear sodas.     Reviewed post txp diet guidelines in brief (will review in further detail post txp):  (1) Review of proper food safety measures d/t immunosuppressant therapy post-op and increased risk for food-borne illness    (2) Avoid the following post txp d/t risk for rejection, unknown effects on the organs, and/or potential interactions with immunosuppressants:  - Herbal, Chinese, holistic, chiropractic, natural, alternative medicines and supplements  - Detoxes and cleanses  - Weight loss pills  - Protein powders or other products with extracts or herbs (ie green tea extract)    (3) Med regimen and possible side effects    Patient Understanding: Pt verbalized understanding of education provided.  Expected Compliance: Good  Follow-Up Plans: PRN     Nutrition Goals  1. Limit Na+ <2000mg/day  2. Pt to verbalize understanding of 3 aspects of post txp education provided    Provided pt with contact info.   Marybeth Sommers RD, LD  [facility] [phone]

## 2019-01-14 NOTE — LETTER
1/14/2019      RE: Santos Galloway  [address] [facility] [address]       Assessment and Plan:  # Kidney transplant evaluation - patient is a good candidate overall. Benefits of a living donor transplant were discussed.  # CKD stage 5 from diabetes mellitus type 2 and hypertension - current eGFR 14-16 ml/min. He has uremic symptoms and is nearing the need for renal replacement therapy and would benefit from a kidney transplant.   # Type 2 diabetes - currently controlled without oral agents or insulin (insulin dc'd a few years ago). We discussed the potential need for insulin post-kidney transplant.   # CAD - no history of cardiac disease, but patient will be seen by Cardiology prior to transplant.  # PAD - mentioned on outside CT scans of the abdomen/pelvis dated 5+ years ago. Will repeat non-contrast abdomen/pelvic CT and order iliofemoral US with dopplers.   # Microscopic hematuria - 6-10 RBC's on outside urinalysis. Given age and lack of smoking history, more likely that this is related to his underlying diabetic nephropathy than bladder cancer. Repeat UA pending.   # Anxiety - currently taking sertraline and managed by his PCP. Appreciate social work in put.   # Health maintenance - due for dental.    Discussed the risks and benefits of a transplant, including the risk of surgery and immunosuppression medications.  Patient's overall evaluation will be discussed in the Transplant Program's regular meeting with a final recommendation on the patient's suitability for transplant to be made at that time.  Patient was seen in conjunction with Dr. Bert Garcia as part of a shared visit.    Patient was seen by myself, Dr. Bert Garcia, in conjunction with Khadijah Momin PA-C as part of a shared visit.    I personally reviewed past medical and surgical history, vital signs, medications and labs.  Present and past medical history, along with significant physical exam findings were all reviewed with DIAMANTE.    My key  findings:  Santos Galloway is a 48 year old year old male with CKD from diabetes mellitus type 2, who presents for kidney transplant evaluation.  Patient reports feeling okay overall, but with some medical complaints.    Key management decisions made by me and discussed with DIAMANTE:  # Kidney transplant evaluation: patient is a good candidate overall. Benefits of a living donor transplant were discussed.  # CKD from diabetes mellitus type 2: Patient has had a progressive decline in kidney function and is nearing need for renal replacement therapy and would benefit from a kidney transplant.  Preferably, a preemptive living donor kidney transplant.  # DM type 2: Good control off all medications at this time.  # Cardiac Risk: Patient will require Cardiology evaluation prior to transplant.  # PAD: Some atherosclerotic findings on outside imaging, but patient is asymptomatic.  Will obtain further imaging and discuss with Transplant Surgery.  # Anxiety: Appears stable, but appreciate  input.    Evaluation:  Santos Galloway was seen in consultation at the request of Dr. Geo Cheung for evaluation as a potential kidney transplant recipient.    Reason for Visit:  Santos Galloway is a 48-year-old male with CKD from diabetes mellitus type 2 and hypertension, who presents for kidney transplant evaluation.    HPI:  Mr. Galloway is a 48-year-old  male with CKD from presumed diabetes and hypertension. He was diagnosed with type 2 diabetes approximately 10-15 years ago that has also been complicated by gastroparesis, retinopathy, and peripheral neuropathy. He was previously on insulin (initiated in mid-2013), which was discontinued approximately 2 years ago (creatinine at time of discontinuation around 1.9-2.0 mg/dl, A1c's since around 5%). He is no longer checking blood sugars. He denies history of hypoglycemic unawareness. He began following with nephrology about 3 years ago when he was found to have serum creatinine in the  mid-1's mg/dl, as well as proteinuria and microscopic hematuria. Serologic work up was negative. Renal imaging showed echogenic kidneys. He has not had a kidney biopsy. He has continued to have a progressive decline in kidney function (creatinine 1.5-1.7 mg/dl [id number], 1.9-2.0 mg/dl November 2016 to May 2017, 3.3 mg/dl May 2018, now low-to-mid 4's mg/dl since July 2018). He had an AV fistula placed, but is not yet on dialysis.     He has no known history of cardiac disease or events, but does report a strong family history of coronary artery disease. In fact, his uncle is undergoing CABG today. He was previously physically active at work, but with uremic symptoms, he has had to stop working. He can walk several blocks and denies chest pain, SOB, or claudication symptoms.     Overall, he is feeling poorly. His energy level and appetite have been reduced over the past few months. He has intermittent nausea and vomiting. He estimates he has lost approximately 10 lbs due to decreased appetite. No diarrhea. He hasn't noticed any changes in urination, or dysuria, hematuria, or trouble emptying his bladder. No fever, sweats, or recent illness.         Kidney Disease Hx:        Kidney Disease Dx: DM/HTN       Biopsy Proven: No         On Dialysis: No       Primary Nephrologist: Dr. Aurelia Acosta Hx:       h/o HTN: Yes         h/o DM:  Yes        h/o Protein in Urine: Yes        h/o Blood in Urine:  Yes        h/o Kidney Stones:  No       h/o UTI: No       h/o Chronic NSAID Use: No         Previous Transplant Hx:        No         Transplant Sensitization Hx:       Previous Tx: No       Blood Transfusion: No         Uremic Symptoms:       Fatigue: Yes; Cold: No; Nausea: Yes; Poor Appetite: Yes; Metallic Taste: No; Edema: No;         Cardiovascular Hx:       h/o Cardiac Issues: No       Exercise Tolerance: no chest pain or shortness of breath with exertion.         Health Maintenance:       Dental: Not up to date          Potential Donor(s): son     ROS:  A comprehensive review of systems was obtained and negative, except as noted in the HPI or PMH.    PMH:   Medical record was reviewed and PMH was discussed with patient and noted below.  Past Medical History:   Diagnosis Date     Anxiety      Cataract      CKD (chronic kidney disease) stage 5, GFR less than 15 ml/min (H)      Gastroparesis      GERD (gastroesophageal reflux disease)      Glaucoma      Hypertension      Peripheral arterial disease (H)      Type 2 diabetes mellitus (H)        PSH:   Past Surgical History:   Procedure Laterality Date     CIRCUMCISION       CREATE FISTULA ARTERIOVENOUS UPPER EXTREMITY       Personal or family history of bleeding or anesthesia problems: No    Family Hx:  Family History   Problem Relation Age of Onset     Diabetes Mother      Kidney Disease Mother      Diabetes Maternal Grandmother      Diabetes Maternal Grandfather      Diabetes Paternal Grandmother      Coronary Artery Disease Maternal Uncle        Personal Hx:   Social History     Socioeconomic History     Marital status:      Spouse name: Not on file     Number of children: Not on file     Years of education: Not on file     Highest education level: Not on file   Social Needs     Financial resource strain: Not on file     Food insecurity - worry: Not on file     Food insecurity - inability: Not on file     Transportation needs - medical: Not on file     Transportation needs - non-medical: Not on file   Occupational History     Not on file   Tobacco Use     Smoking status: Never Smoker     Smokeless tobacco: Never Used   Substance and Sexual Activity     Alcohol use: No     Drug use: No     Sexual activity: Not on file   Other Topics Concern     Parent/sibling w/ CABG, MI or angioplasty before 65F 55M? Not Asked   Social History Narrative     Not on file       Allergies:  No Known Allergies    Medications:  Prior to Admission medications    Not on File       Vitals:  BP  "132/66   Pulse 87   Temp 98.3  F (36.8  C) (Oral)   Ht 1.605 m (5' 3.18\")   Wt 67.5 kg (148 lb 12.8 oz)   SpO2 99%   BMI 26.21 kg/m       Exam:  GENERAL APPEARANCE: alert and no distress  HENT: mouth without ulcers or lesions. Poor dentition  LYMPHATICS: no cervical or supraclavicular nodes  RESP: lungs clear to auscultation - no rales, rhonchi or wheezes  CV: regular rhythm, normal rate, no rub, no murmur  EDEMA: no LE edema bilaterally   ABDOMEN: soft, nondistended, nontender  MS: extremities normal - no gross deformities noted, no evidence of inflammation in joints, no muscle tenderness  SKIN: no rash  Femoral pulses 2+ equal bilaterally     Results:   Recent Results (from the past 336 hour(s))   EKG 12-lead, tracing only [EKG1]    Collection Time: 01/14/19  1:17 PM   Result Value Ref Range    Interpretation ECG Click View Image link to view waveform and result    UA with Microscopic    Collection Time: 01/14/19  1:57 PM   Result Value Ref Range    Color Urine Yellow     Appearance Urine Clear     Glucose Urine 50 (A) NEG^Negative mg/dL    Bilirubin Urine Negative NEG^Negative    Ketones Urine Negative NEG^Negative mg/dL    Specific Gravity Urine 1.010 1.003 - 1.035    Blood Urine Negative NEG^Negative    pH Urine 5.0 5.0 - 7.0 pH    Protein Albumin Urine >499 (A) NEG^Negative mg/dL    Urobilinogen mg/dL 0.0 0.0 - 2.0 mg/dL    Nitrite Urine Negative NEG^Negative    Leukocyte Esterase Urine Negative NEG^Negative    Source Midstream Urine     WBC Urine 1 0 - 5 /HPF    RBC Urine 2 0 - 2 /HPF    Squamous Epithelial /HPF Urine <1 0 - 1 /HPF    Mucous Urine Present (A) NEG^Negative /LPF   Comprehensive metabolic panel [LAB17]    Collection Time: 01/14/19  2:02 PM   Result Value Ref Range    Sodium 139 133 - 144 mmol/L    Potassium 4.3 3.4 - 5.3 mmol/L    Chloride 114 (H) 94 - 109 mmol/L    Carbon Dioxide 18 (L) 20 - 32 mmol/L    Anion Gap 8 3 - 14 mmol/L    Glucose 130 (H) 70 - 99 mg/dL    Urea Nitrogen 55 (H) 7 " - 30 mg/dL    Creatinine 5.14 (H) 0.66 - 1.25 mg/dL    GFR Estimate 12 (L) >60 mL/min/[1.73_m2]    GFR Estimate If Black 14 (L) >60 mL/min/[1.73_m2]    Calcium 6.3 (L) 8.5 - 10.1 mg/dL    Bilirubin Total 0.2 0.2 - 1.3 mg/dL    Albumin 2.5 (L) 3.4 - 5.0 g/dL    Protein Total 5.6 (L) 6.8 - 8.8 g/dL    Alkaline Phosphatase 120 40 - 150 U/L    ALT 15 0 - 70 U/L    AST 11 0 - 45 U/L   CBC with platelets differential [IWQ391]    Collection Time: 01/14/19  2:02 PM   Result Value Ref Range    WBC 6.7 4.0 - 11.0 10e9/L    RBC Count 3.66 (L) 4.4 - 5.9 10e12/L    Hemoglobin 9.8 (L) 13.3 - 17.7 g/dL    Hematocrit 32.2 (L) 40.0 - 53.0 %    MCV 88 78 - 100 fl    MCH 26.8 26.5 - 33.0 pg    MCHC 30.4 (L) 31.5 - 36.5 g/dL    RDW 14.4 10.0 - 15.0 %    Platelet Count 182 150 - 450 10e9/L    Diff Method Automated Method     % Neutrophils 73.3 %    % Lymphocytes 18.8 %    % Monocytes 4.8 %    % Eosinophils 2.3 %    % Basophils 0.6 %    % Immature Granulocytes 0.2 %    Nucleated RBCs 0 0 /100    Absolute Neutrophil 4.9 1.6 - 8.3 10e9/L    Absolute Lymphocytes 1.3 0.8 - 5.3 10e9/L    Absolute Monocytes 0.3 0.0 - 1.3 10e9/L    Absolute Eosinophils 0.2 0.0 - 0.7 10e9/L    Absolute Basophils 0.0 0.0 - 0.2 10e9/L    Abs Immature Granulocytes 0.0 0 - 0.4 10e9/L    Absolute Nucleated RBC 0.0    INR [[id number]]    Collection Time: 01/14/19  2:02 PM   Result Value Ref Range    INR 1.03 0.86 - 1.14   Partial thromboplastin time [LAB56]    Collection Time: 01/14/19  2:02 PM   Result Value Ref Range    PTT 28 22 - 37 sec   Hemoglobin A1c [LAB90]    Collection Time: 01/14/19  2:02 PM   Result Value Ref Range    Hemoglobin A1C 5.0 0 - 5.6 %       PKE

## 2019-01-14 NOTE — LETTER
January 14, 2019      To Whom It May Concern,       Santos Galloway was seen at the [facility] for kidney transplant evaluation on January 14, 2019. Please contact me with any questions.       Thank you,           Martha Boyle [facility]    [facility]  [phone]

## 2019-01-14 NOTE — PROGRESS NOTES
Psychosocial Assessment  Patient Name/ Age: Santos Galloway 48 year old   Medical Record #: [id number]  Duration of Interview:   30 min  Process:   Face-to-Face Interview                (counseling < 50%)   Present at Appointment: Santos        : YAKELIN Corbett Date:  2019        Type of transplant: Kidney    Donor type: Santos reported his 18 year old son may be interested in being a donor.    Cadaver and son   Prior Transplants:    No Status of Transplant: n/a       Current Living Situation    Location:   [address] [place] [id number]  With Whom: lives with their family wife and four children       Family/ Social Support:    Santos reported he has four children Saad (18), Deana (17), Reji (16), and Jimmy (13). Santos has 10 siblings. Two live near him and the rest live near the [facility]. His mother is  and he does not have a relationship with his father.  available, helpful   Committed relationship: Santos is  to Alexandra     stable/supportive   Other supports: Family    available, helpful       Activities/ Functional Ability    Current level: ambulatory, visually impaired and independent with ADL's     Transportation drives self       Vocational/Employment/Financial     Employment   unemployed   Job Description   Santos is currently unemployed but has applied for SSDI. He last worked a few months ago but he was told by his physician that he wasn't able to work due to his health. He previously worked in a factory packaging calendars. His wife is unemployed   Income   other: [facility]   Insurance      At this time, patient can afford medication costs:  Yes  [facility]       Medical Status    Current mode of treatment for ESRD none   Complications- Type 2 diabetes, not on medications none       Behavioral    Tobacco Use No Chemical Dependency No   Santos denied any tobacco use.  Santos reported he has been sober from alcohol for 20 years. He denied any  current use. He also denied any current or history of substance use. Santos reported he did not go to treatment and quit drinking on his own.     Psychiatric Impairment No  Santos denied any current or history of anxiety, depression, or other mental health concerns.     Reading ability Good  Education level: 8th grade- Santos reported he is able to read and understand health information that is provided to him. Recent Legal History No      Coping Style/Strategies: Santos reported he tries not to let things bother him and he stays fairly busy       Ability to Adhere to Complex Medical Regime: Yes     Adherence History:        Education  _X_ Medicare  _X_ Rehabilitation  _X_ Donor issues  _X_ Community resources  _X_ Post discharge housing  _X_ Financial resources  _X_ Medical insurance options  _X_ Psych adjustment  _X_ Family adjustment  _X_ Health Care Directive No, provided education and a blank copy for Santos to complete   Psychosocial Risks of Transplant Reviewed and Discussed:  _X_ Increased stress related to emotional,            family, social, employment or financial           situation  _X_ Affect on work and/or disability benefits  _X_ Affect on future health and life           insurance  _X_ Transplant outcome expectations may           not be met  _X_ Mental Health Risks: anxiety,           depression, PTSD, guilt, grief and           chronic fatigue     Notable Items:   None noted.  Provided Rachel letter stating he attended evaluation clinic for [facility] reimbursement. Santos to provide letter to his [facility].      Final Evaluation/Assessment   Patient seemed to process information well. Appeared well informed, motivated and able to follow post transplant requirements. Behavior was appropriate during interview. Has adequate income and insurance coverage. Adequate social support. No major contraindications noted for transplant.  At this time patient appears to understand the risks and benefits of transplant.       Recommendation  Acceptable    Selection Criteria Met:  Plan for support Yes   Chemical Dependence Yes   Smoking Yes   Mental Health Yes   Adequate Finances Yes    Signature: YAKELIN Corbett   Title: Clinical

## 2019-01-14 NOTE — LETTER
1/14/2019       RE: Santos Galloway  [address] [facility] [address]     Dear Colleague,    Thank you for referring your patient, Santos Galloway, to the [facility] SOLID ORGAN TRANSPLANT at [facility]. Please see a copy of my visit note below.    Assessment and Plan:  # Kidney transplant evaluation - patient is a good candidate overall. Benefits of a living donor transplant were discussed.  # CKD stage 5 from diabetes mellitus type 2 and hypertension - current eGFR 14-16 ml/min. He has uremic symptoms and is nearing the need for renal replacement therapy and would benefit from a kidney transplant.   # Type 2 diabetes - currently controlled without oral agents or insulin (insulin dc'd a few years ago). We discussed the potential need for insulin post-kidney transplant.   # CAD - no history of cardiac disease, but patient will be seen by Cardiology prior to transplant.  # PAD - mentioned on outside CT scans of the abdomen/pelvis dated 5+ years ago. Will repeat non-contrast abdomen/pelvic CT and order iliofemoral US with dopplers.   # Microscopic hematuria - 6-10 RBC's on outside urinalysis. Given age and lack of smoking history, more likely that this is related to his underlying diabetic nephropathy than bladder cancer. Repeat UA pending.   # Anxiety - currently taking sertraline and managed by his PCP. Appreciate social work in put.   # Health maintenance - due for dental.    Discussed the risks and benefits of a transplant, including the risk of surgery and immunosuppression medications.  Patient's overall evaluation will be discussed in the Transplant Program's regular meeting with a final recommendation on the patient's suitability for transplant to be made at that time.  Patient was seen in conjunction with Dr. Bert Garcia as part of a shared visit.    Patient was seen by myself, Dr. Bert Garcia, in conjunction with Khadijah Momin PA-C as part of a shared visit.    I  personally reviewed past medical and surgical history, vital signs, medications and labs.  Present and past medical history, along with significant physical exam findings were all reviewed with DIAMANTE.    My lorenzo findings:  Santos Galloway is a 48 year old year old male with CKD from diabetes mellitus type 2, who presents for kidney transplant evaluation.  Patient reports feeling okay overall, but with some medical complaints.    Key management decisions made by me and discussed with DIAMANTE:  # Kidney transplant evaluation: patient is a good candidate overall. Benefits of a living donor transplant were discussed.  # CKD from diabetes mellitus type 2: Patient has had a progressive decline in kidney function and is nearing need for renal replacement therapy and would benefit from a kidney transplant.  Preferably, a preemptive living donor kidney transplant.  # DM type 2: Good control off all medications at this time.  # Cardiac Risk: Patient will require Cardiology evaluation prior to transplant.  # PAD: Some atherosclerotic findings on outside imaging, but patient is asymptomatic.  Will obtain further imaging and discuss with Transplant Surgery.  # Anxiety: Appears stable, but appreciate  input.    Evaluation:  Santos Galloway was seen in consultation at the request of Dr. Geo Cheung for evaluation as a potential kidney transplant recipient.    Reason for Visit:  Santos Galloway is a 48-year-old male with CKD from diabetes mellitus type 2 and hypertension, who presents for kidney transplant evaluation.    HPI:  Mr. Galloway is a 48-year-old  male with CKD from presumed diabetes and hypertension. He was diagnosed with type 2 diabetes approximately 10-15 years ago that has also been complicated by gastroparesis, retinopathy, and peripheral neuropathy. He was previously on insulin (initiated in mid-2013), which was discontinued approximately 2 years ago (creatinine at time of discontinuation around 1.9-2.0 mg/dl,  A1c's since around 5%). He is no longer checking blood sugars. He denies history of hypoglycemic unawareness. He began following with nephrology about 3 years ago when he was found to have serum creatinine in the mid-1's mg/dl, as well as proteinuria and microscopic hematuria. Serologic work up was negative. Renal imaging showed echogenic kidneys. He has not had a kidney biopsy. He has continued to have a progressive decline in kidney function (creatinine 1.5-1.7 mg/dl 9329-9070, 1.9-2.0 mg/dl November 2016 to May 2017, 3.3 mg/dl May 2018, now low-to-mid 4's mg/dl since July 2018). He had an AV fistula placed, but is not yet on dialysis.     He has no known history of cardiac disease or events, but does report a strong family history of coronary artery disease. In fact, his uncle is undergoing CABG today. He was previously physically active at work, but with uremic symptoms, he has had to stop working. He can walk several blocks and denies chest pain, SOB, or claudication symptoms.     Overall, he is feeling poorly. His energy level and appetite have been reduced over the past few months. He has intermittent nausea and vomiting. He estimates he has lost approximately 10 lbs due to decreased appetite. No diarrhea. He hasn't noticed any changes in urination, or dysuria, hematuria, or trouble emptying his bladder. No fever, sweats, or recent illness.         Kidney Disease Hx:        Kidney Disease Dx: DM/HTN       Biopsy Proven: No         On Dialysis: No       Primary Nephrologist: Dr. Moses         [facility] Hx:       h/o HTN: Yes         h/o DM:  Yes        h/o Protein in Urine: Yes        h/o Blood in Urine:  Yes        h/o Kidney Stones:  No       h/o UTI: No       h/o Chronic NSAID Use: No         Previous Transplant Hx:        No         Transplant Sensitization Hx:       Previous Tx: No       Blood Transfusion: No         Uremic Symptoms:       Fatigue: Yes; Cold: No; Nausea: Yes; Poor Appetite: Yes; Metallic  Taste: No; Edema: No;         Cardiovascular Hx:       h/o Cardiac Issues: No       Exercise Tolerance: no chest pain or shortness of breath with exertion.         Health Maintenance:       Dental: Not up to date         Potential Donor(s): son     PMH:   Medical record was reviewed and PMH was discussed with patient and noted below.  Past Medical History:   Diagnosis Date     Anxiety      Cataract      CKD (chronic kidney disease) stage 5, GFR less than 15 ml/min (H)      Gastroparesis      GERD (gastroesophageal reflux disease)      Glaucoma      Hypertension      Peripheral arterial disease (H)      Type 2 diabetes mellitus (H)        PSH:   Past Surgical History:   Procedure Laterality Date     CIRCUMCISION       CREATE FISTULA ARTERIOVENOUS UPPER EXTREMITY       Personal or family history of bleeding or anesthesia problems: No    Family Hx:  Family History   Problem Relation Age of Onset     Diabetes Mother      Kidney Disease Mother      Diabetes Maternal Grandmother      Diabetes Maternal Grandfather      Diabetes Paternal Grandmother      Coronary Artery Disease Maternal Uncle        Personal Hx:   Social History     Socioeconomic History     Marital status:      Spouse name: Not on file     Number of children: Not on file     Years of education: Not on file     Highest education level: Not on file   Social Needs     Financial resource strain: Not on file     Food insecurity - worry: Not on file     Food insecurity - inability: Not on file     Transportation needs - medical: Not on file     Transportation needs - non-medical: Not on file   Occupational History     Not on file   Tobacco Use     Smoking status: Never Smoker     Smokeless tobacco: Never Used   Substance and Sexual Activity     Alcohol use: No     Drug use: No     Sexual activity: Not on file   Other Topics Concern     Parent/sibling w/ CABG, MI or angioplasty before 65F 55M? Not Asked   Social History Narrative     Not on file  "      Allergies:  No Known Allergies    Medications:  Prior to Admission medications    Not on File       Vitals:  /66   Pulse 87   Temp 98.3  F (36.8  C) (Oral)   Ht 1.605 m (5' 3.18\")   Wt 67.5 kg (148 lb 12.8 oz)   SpO2 99%   BMI 26.21 kg/m       Exam:  GENERAL APPEARANCE: alert and no distress  HENT: mouth without ulcers or lesions. Poor dentition  LYMPHATICS: no cervical or supraclavicular nodes  RESP: lungs clear to auscultation - no rales, rhonchi or wheezes  CV: regular rhythm, normal rate, no rub, no murmur  EDEMA: no LE edema bilaterally   ABDOMEN: soft, nondistended, nontender  MS: extremities normal - no gross deformities noted, no evidence of inflammation in joints, no muscle tenderness  SKIN: no rash  Femoral pulses 2+ equal bilaterally     Results:   Recent Results (from the past 336 hour(s))   EKG 12-lead, tracing only [EKG1]    Collection Time: 01/14/19  1:17 PM   Result Value Ref Range    Interpretation ECG Click View Image link to view waveform and result    UA with Microscopic    Collection Time: 01/14/19  1:57 PM   Result Value Ref Range    Color Urine Yellow     Appearance Urine Clear     Glucose Urine 50 (A) NEG^Negative mg/dL    Bilirubin Urine Negative NEG^Negative    Ketones Urine Negative NEG^Negative mg/dL    Specific Gravity Urine 1.010 1.003 - 1.035    Blood Urine Negative NEG^Negative    pH Urine 5.0 5.0 - 7.0 pH    Protein Albumin Urine >499 (A) NEG^Negative mg/dL    Urobilinogen mg/dL 0.0 0.0 - 2.0 mg/dL    Nitrite Urine Negative NEG^Negative    Leukocyte Esterase Urine Negative NEG^Negative    Source Midstream Urine     WBC Urine 1 0 - 5 /HPF    RBC Urine 2 0 - 2 /HPF    Squamous Epithelial /HPF Urine <1 0 - 1 /HPF    Mucous Urine Present (A) NEG^Negative /LPF   Comprehensive metabolic panel [LAB17]    Collection Time: 01/14/19  2:02 PM   Result Value Ref Range    Sodium 139 133 - 144 mmol/L    Potassium 4.3 3.4 - 5.3 mmol/L    Chloride 114 (H) 94 - 109 mmol/L    Carbon " Dioxide 18 (L) 20 - 32 mmol/L    Anion Gap 8 3 - 14 mmol/L    Glucose 130 (H) 70 - 99 mg/dL    Urea Nitrogen 55 (H) 7 - 30 mg/dL    Creatinine 5.14 (H) 0.66 - 1.25 mg/dL    GFR Estimate 12 (L) >60 mL/min/[1.73_m2]    GFR Estimate If Black 14 (L) >60 mL/min/[1.73_m2]    Calcium 6.3 (L) 8.5 - 10.1 mg/dL    Bilirubin Total 0.2 0.2 - 1.3 mg/dL    Albumin 2.5 (L) 3.4 - 5.0 g/dL    Protein Total 5.6 (L) 6.8 - 8.8 g/dL    Alkaline Phosphatase 120 40 - 150 U/L    ALT 15 0 - 70 U/L    AST 11 0 - 45 U/L   CBC with platelets differential [ZDW445]    Collection Time: 01/14/19  2:02 PM   Result Value Ref Range    WBC 6.7 4.0 - 11.0 10e9/L    RBC Count 3.66 (L) 4.4 - 5.9 10e12/L    Hemoglobin 9.8 (L) 13.3 - 17.7 g/dL    Hematocrit 32.2 (L) 40.0 - 53.0 %    MCV 88 78 - 100 fl    MCH 26.8 26.5 - 33.0 pg    MCHC 30.4 (L) 31.5 - 36.5 g/dL    RDW 14.4 10.0 - 15.0 %    Platelet Count 182 150 - 450 10e9/L    Diff Method Automated Method     % Neutrophils 73.3 %    % Lymphocytes 18.8 %    % Monocytes 4.8 %    % Eosinophils 2.3 %    % Basophils 0.6 %    % Immature Granulocytes 0.2 %    Nucleated RBCs 0 0 /100    Absolute Neutrophil 4.9 1.6 - 8.3 10e9/L    Absolute Lymphocytes 1.3 0.8 - 5.3 10e9/L    Absolute Monocytes 0.3 0.0 - 1.3 10e9/L    Absolute Eosinophils 0.2 0.0 - 0.7 10e9/L    Absolute Basophils 0.0 0.0 - 0.2 10e9/L    Abs Immature Granulocytes 0.0 0 - 0.4 10e9/L    Absolute Nucleated RBC 0.0    INR [CQY6259]    Collection Time: 01/14/19  2:02 PM   Result Value Ref Range    INR 1.03 0.86 - 1.14   Partial thromboplastin time [LAB56]    Collection Time: 01/14/19  2:02 PM   Result Value Ref Range    PTT 28 22 - 37 sec   Hemoglobin A1c [LAB90]    Collection Time: 01/14/19  2:02 PM   Result Value Ref Range    Hemoglobin A1C 5.0 0 - 5.6 %     Again, thank you for allowing me to participate in the care of your patient.      Sincerely,    Bert Garcia MD

## 2019-01-15 LAB
BLD GP AB SCN SERPL QL: NORMAL
BLD GP AB SCN TITR SERPL: NORMAL {TITER}
C PEPTIDE SERPL-MCNC: 11.3 NG/ML (ref 0.9–6.9)
CARDIOLIPIN ANTIBODY IGG: <1.6 GPL-U/ML (ref 0–19.9)
CARDIOLIPIN ANTIBODY IGM: 0.5 MPL-U/ML (ref 0–19.9)
CMV IGG SERPL QL IA: >8 AI (ref 0–0.8)
EBV VCA IGG SER QL IA: 1.4 AI (ref 0–0.8)
HBV CORE AB SERPL QL IA: NONREACTIVE
HBV SURFACE AB SERPL IA-ACNC: 0.05 M[IU]/ML
HBV SURFACE AG SERPL QL IA: NONREACTIVE
HCV AB SERPL QL IA: NONREACTIVE
HIV 1+2 AB+HIV1 P24 AG SERPL QL IA: NONREACTIVE
INTERPRETATION ECG - MUSE: NORMAL
T PALLIDUM AB SER QL: NONREACTIVE
THROMBIN TIME: 15.3 SEC (ref 13–19)
VZV IGG SER QL IA: 1.2 AI (ref 0–0.8)

## 2019-01-16 LAB
A* LOCUS: NORMAL
A*: NORMAL
ABTEST METHOD: NORMAL
B* LOCUS: NORMAL
BW-1: NORMAL
C* LOCUS: NORMAL
DPA1* NMDP: NORMAL
DPA1*: NORMAL
DPB1* NMDP: NORMAL
DPB1*: NORMAL
DQA1*: NORMAL
DQA1*LOCUS: NORMAL
DQB1* LOCUS: NORMAL
DQB1*: NORMAL
DRB1* LOCUS: NORMAL
DRB1*: NORMAL
DRB4* LOCUS: NORMAL
DRSSO TEST METHOD: NORMAL
GAMMA INTERFERON BACKGROUND BLD IA-ACNC: 0.04 IU/ML
LA PPP-IMP: NEGATIVE
M TB IFN-G BLD-IMP: NEGATIVE
M TB IFN-G CD4+ BCKGRND COR BLD-ACNC: >10 IU/ML
MITOGEN IGNF BCKGRD COR BLD-ACNC: 0 IU/ML
MITOGEN IGNF BCKGRD COR BLD-ACNC: 0.01 IU/ML
PROTOCOL CUTOFF: NORMAL
SA1 CELL: NORMAL
SA1 COMMENTS: NORMAL
SA1 HI RISK ABY: NORMAL
SA1 MOD RISK ABY: NORMAL
SA1 TEST METHOD: NORMAL
SA2 CELL: NORMAL
SA2 COMMENTS: NORMAL
SA2 HI RISK ABY UA: NORMAL
SA2 MOD RISK ABY: NORMAL
SA2 TEST METHOD: NORMAL
UNACCEPTABLE ANTIGEN: NORMAL
UNOS CPRA: 0

## 2019-01-16 NOTE — PROGRESS NOTES
RE: Santos Galloway    [facility]# [id number]        I saw your patient, Santos Galloway, in consultation in our pretransplant clinic.  He was at clinic to discuss care for his end-stage renal disease.  Prior to clinic, he attended our pretransplant class.       We talked about the pros and cons of transplantation vs. dialysis.  We discussed the fact that it was important that he think about the pros and cons of each treatment option and make an active decision.  We also discussed the fact that the two were interconnected and he may need to go on dialysis before transplant (if he chose to have a transplant) and that if the transplant failed, he might need dialysis before another transplant.       We also discussed the fact that if he chose to have a transplant, he would need to decide between going on the wait list for a  donor transplant vs. having a living donor transplant.  We talked about the pros and cons of each option.  Although I didn't express an opinion regarding transplantation or dialysis, I suggested that if he chose to have a transplant, a living donor transplant would be preferable in that the surgery is the same, the immunosuppressive drugs and the risks are the same, but the transplant could be done sooner and the results are better.  I told him that the wait for  donor kidney was approximately five years for patients who are newly put on the waiting list.  In addition, we talked about the fact that the disadvantage of a living donor transplant was the risk to the donor.       Of note, his son is interested in donating. His son is 18 and they do have a family history of type II diabetes. I suggested that he look to see whether other family members (his siblings that do not have diabetes) or friends that might be potential donors.  Because he was interested in living donation, we spent some time discussing donor risks, including the risks of mortality, morbidity, and long-term risks with a single  kidney. I also provided him with our donor DVD to view and share at his leisure.     I attempted to answer any remaining questions.  I also told him that should he have any questions, he should feel free to contact us.  We would be glad to answer any questions either over the phone or at another clinic visit.  His transplant coordinator is Waleska Faith and may be reached at [phone].  Thank you for the opportunity to see him.     I spent 20 minutes with this patient.  Over 90% of that time was spent in counseling and coordination of care.             Yours truly,               Geo Cheung MD         Professor of Surgery         ([phone])    LETI/

## 2019-01-18 LAB — COPATH REPORT: NORMAL

## 2019-01-23 ENCOUNTER — COMMITTEE REVIEW (OUTPATIENT)
Dept: TRANSPLANT | Facility: CLINIC | Age: 49
End: 2019-01-23

## 2019-01-23 NOTE — COMMITTEE REVIEW
Abdominal Committee Review Note     Evaluation Date: 1/14/2019  Committee Review Date: 1/23/2019    Organ being evaluated for: Kidney    Transplant Phase: Evaluation  Transplant Status: Active    Transplant Coordinator: Shania Cuevas  Transplant Surgeon:       Referring Physician: Dahiana Martinez    Primary Diagnosis: Diabetes Mellitus - Type II  Secondary Diagnosis: Hypertensive Nephrosclerosis    Committee Review Members:  Nephrology Curry Woods MD, Mariano Roth, APRN CNP, Bob Beckwith MD   Nutrition Marybeth Sommers,    Pharmacy Ricky Amos, [facility]    - Clinical Martha Boyle, [facility], Debra Andrade, [facility]   Transplant Khadijah Momin PA-C, Sridevi Link, RN, Mesha Dowling, RN, Yazmin Fenton, ROSHNI, Katey Delatorre, RN, Shania Cuevas, RN, Vidal Hatfield MD, Dahiana Green, RN       Transplant Eligibility: Irreversible chronic kidney disease treated w/dialysis or expected need for dialysis    Committee Review Decision: Approved for Kidney list ON HOLD    Relative Contraindications: None    Absolute Contraindications: None    Committee Chair Vidal Hatfield MD verbally attested to the committee's decision.    Committee Discussion Details: Team reviewed and all agree that he is a good candidate.  Team wants him to see Cardiology, get iliac US and abd/pelvis CT.  In the meantime he can be placed on the Kidney list on hold.    Outstanding issues:  1.  Cardiology risk assessment  2.  Iliac US  3.  Abd/pelvic CT  4.  Dental  5.  Vaccines  6.  Financial PA process    I contacted Santos and explained all to him.  He states understanding and is happy to move forward with kidney list on hold.

## 2019-01-25 DIAGNOSIS — I73.9 PAD (PERIPHERAL ARTERY DISEASE) (H): Primary | ICD-10-CM

## 2019-02-04 ENCOUNTER — TELEPHONE (OUTPATIENT)
Dept: TRANSPLANT | Facility: CLINIC | Age: 49
End: 2019-02-04

## 2019-02-11 ENCOUNTER — DOCUMENTATION ONLY (OUTPATIENT)
Dept: TRANSPLANT | Facility: CLINIC | Age: 49
End: 2019-02-11

## 2019-03-08 NOTE — TELEPHONE ENCOUNTER
Called patient to schedule CVC, CT Abd/Pelvis & Aorta Iliac Dup US, patient confirmed for Tues, March 26, mailing schedule

## 2019-03-11 ENCOUNTER — DOCUMENTATION ONLY (OUTPATIENT)
Dept: CARE COORDINATION | Facility: CLINIC | Age: 49
End: 2019-03-11

## 2019-03-26 ENCOUNTER — ANCILLARY PROCEDURE (OUTPATIENT)
Dept: ULTRASOUND IMAGING | Facility: CLINIC | Age: 49
End: 2019-03-26
Attending: PHYSICIAN ASSISTANT
Payer: COMMERCIAL

## 2019-03-26 ENCOUNTER — ANCILLARY PROCEDURE (OUTPATIENT)
Dept: CT IMAGING | Facility: CLINIC | Age: 49
End: 2019-03-26
Attending: PHYSICIAN ASSISTANT
Payer: COMMERCIAL

## 2019-03-26 ENCOUNTER — OFFICE VISIT (OUTPATIENT)
Dept: CARDIOLOGY | Facility: CLINIC | Age: 49
End: 2019-03-26
Attending: INTERNAL MEDICINE
Payer: COMMERCIAL

## 2019-03-26 VITALS
HEART RATE: 86 BPM | BODY MASS INDEX: 25.55 KG/M2 | HEIGHT: 63 IN | OXYGEN SATURATION: 98 % | SYSTOLIC BLOOD PRESSURE: 139 MMHG | DIASTOLIC BLOOD PRESSURE: 73 MMHG | WEIGHT: 144.2 LBS

## 2019-03-26 DIAGNOSIS — Z76.82 ORGAN TRANSPLANT CANDIDATE: ICD-10-CM

## 2019-03-26 DIAGNOSIS — N18.5 TYPE 2 DIABETES MELLITUS WITH STAGE 5 CHRONIC KIDNEY DISEASE NOT ON CHRONIC DIALYSIS, WITHOUT LONG-TERM CURRENT USE OF INSULIN (H): ICD-10-CM

## 2019-03-26 DIAGNOSIS — N18.5 CKD (CHRONIC KIDNEY DISEASE) STAGE 5, GFR LESS THAN 15 ML/MIN (H): Primary | ICD-10-CM

## 2019-03-26 DIAGNOSIS — I10 ESSENTIAL HYPERTENSION: ICD-10-CM

## 2019-03-26 DIAGNOSIS — E11.22 TYPE 2 DIABETES MELLITUS WITH STAGE 5 CHRONIC KIDNEY DISEASE NOT ON CHRONIC DIALYSIS, WITHOUT LONG-TERM CURRENT USE OF INSULIN (H): ICD-10-CM

## 2019-03-26 PROCEDURE — 99204 OFFICE O/P NEW MOD 45 MIN: CPT | Mod: ZP | Performed by: INTERNAL MEDICINE

## 2019-03-26 PROCEDURE — G0463 HOSPITAL OUTPT CLINIC VISIT: HCPCS | Mod: ZF

## 2019-03-26 ASSESSMENT — MIFFLIN-ST. JEOR: SCORE: 1429.22

## 2019-03-26 ASSESSMENT — PAIN SCALES - GENERAL: PAINLEVEL: NO PAIN (0)

## 2019-03-26 NOTE — PATIENT INSTRUCTIONS
Patient Instructions:  Schedule dobutamine stress echo at [facility] - order will be faxed to them.  They will need to fax the results to us once complete.   Once the results are compiled and reviewed, you will be notified.       It was a pleasure to see you in the cardiology clinic today.    We are encouraging the use of [employer] to communicate with your Healthcare Provider.  If you have any questions, call  Christiano San LPN, at [phone].  Press Option #1 for the [facility], and then press Option #3 for nursing.      If you have an urgent need after hours (8:00 am to 4:30 pm) please call [phone] and ask for the cardiology fellow on call.        INSTRUCTIONS FOR DOBUTAMINE STRESS ECHO:    Nothing to eat or drink for 3 hours before test except for water.   No caffeine, tobacco, or alcohol for 12 hrs before test.       If you have further questions, please utilize [employer] to contact us.     If your question concerns the above instructions, contact:  Christiano San LPN  Nurse Care Coordinator- Heart Care  [phone]

## 2019-03-26 NOTE — LETTER
3/26/2019      RE: Santos Galloway  [address] [facility] [address]       Dear Colleague,    Thank you for the opportunity to participate in the care of your patient, Santos Galloway, at the [facility]. Please see a copy of my visit note below.       SUBJECTIVE:  Santos Galloway is a 48 year old male who presents for renal transplant evaluation.  CKD stage V, not on dialysis yet.  Do have a fistula in place.  Diabetic for 14 years.  Currently not very active denied cardiac symptoms.  Hypertensive.  Lipids unknown.  Do not not on any diabetic medications currently due to CKD.  Ex-smoker.  No premature CAD in family.    Patient Active Problem List    Diagnosis Date Noted     Type 2 diabetes mellitus (H)      Priority: Medium     Hypertension      Priority: Medium     CKD (chronic kidney disease) stage 5, GFR less than 15 ml/min (H)      Priority: Medium     Anxiety      Priority: Medium     Peripheral arterial disease (H)      Priority: Medium    .  Current Outpatient Medications   Medication Sig     calcitRIOL (ROCALTROL) 0.25 MCG capsule      calcium carbonate (TUMS) 500 MG chewable tablet 2 tabs at bedtime     darbepoetin sanchez (ARANESP) 25 MCG/0.42ML injection Every 2 weeks per CRF guidelines - restart when hemoglobin is less then or equal to 11.6.     hydrALAZINE (APRESOLINE) 50 MG tablet Take 50 mg by mouth     lisinopril (PRINIVIL/ZESTRIL) 20 MG tablet      omeprazole (PRILOSEC) 20 MG DR capsule Take 20 mg by mouth     sertraline (ZOLOFT) 25 MG tablet      simvastatin (ZOCOR) 20 MG tablet Take 20 mg by mouth     torsemide (DEMADEX) 10 MG tablet Take 10 mg by mouth     vitamin B-12 (CYANOCOBALAMIN) 1000 MCG tablet Take 1,000 mcg by mouth     No current facility-administered medications for this visit.      Past Medical History:   Diagnosis Date     Anxiety      Cataract      CKD (chronic kidney disease) stage 5, GFR less than 15 ml/min (H)      Gastroparesis       GERD (gastroesophageal reflux disease)      Glaucoma      Hypertension      Peripheral arterial disease (H)      Type 2 diabetes mellitus (H)      Past Surgical History:   Procedure Laterality Date     CIRCUMCISION       CREATE FISTULA ARTERIOVENOUS UPPER EXTREMITY       No Known Allergies  Social History     Socioeconomic History     Marital status:      Spouse name: Not on file     Number of children: Not on file     Years of education: Not on file     Highest education level: Not on file   Occupational History     Not on file   Social Needs     Financial resource strain: Not on file     Food insecurity:     Worry: Not on file     Inability: Not on file     Transportation needs:     Medical: Not on file     Non-medical: Not on file   Tobacco Use     Smoking status: Never Smoker     Smokeless tobacco: Never Used   Substance and Sexual Activity     Alcohol use: No     Drug use: No     Sexual activity: Not on file   Lifestyle     Physical activity:     Days per week: Not on file     Minutes per session: Not on file     Stress: Not on file   Relationships     Social connections:     Talks on phone: Not on file     Gets together: Not on file     Attends Protestant service: Not on file     Active member of club or organization: Not on file     Attends meetings of clubs or organizations: Not on file     Relationship status: Not on file     Intimate partner violence:     Fear of current or ex partner: Not on file     Emotionally abused: Not on file     Physically abused: Not on file     Forced sexual activity: Not on file   Other Topics Concern     Parent/sibling w/ CABG, MI or angioplasty before 65F 55M? Not Asked   Social History Narrative     Not on file     Family History   Problem Relation Age of Onset     Diabetes Mother      Kidney Disease Mother      Diabetes Maternal Grandmother      Diabetes Maternal Grandfather      Diabetes Paternal Grandmother      Coronary Artery Disease Maternal Uncle           REVIEW  "OF SYSTEMS:  General: negative, fever, chills, night sweats  Skin: negative, acne, rash and scaling  Eyes: negative, double vision, eye pain and photophobia  Ears/Nose/Throat: negative, nasal congestion and purulent rhinorrhea  Respiratory: No dyspnea on exertion, No cough, No hemoptysis and negative  Cardiovascular: negative, palpitations, tachycardia, irregular heart beat, chest pain, exertional chest pain or pressure, paroxysmal nocturnal dyspnea, dyspnea on exertion and orthopnea  Gastrointestinal: negative, dysphagia, nausea and vomiting  Genitourinary: negative and frequency  Musculoskeletal: negative, fracture, back pain, neck pain and arthritis  Neurologic: negative, headaches, syncope, stroke, seizures and paralysis  Psychiatric: negative, nervous breakdown, thoughts of self-harm and thoughts of hurting someone else  Hematologic/Lymphatic/Immunologic: negative, bleeding disorder, chills and fever  Endocrine: negative, cold intolerance, heat intolerance and hot flashes       OBJECTIVE:  Blood pressure 139/73, pulse 86, height 1.6 m (5' 3\"), weight 65.4 kg (144 lb 3.2 oz), SpO2 98 %.  General Appearance: alert and no distress  Head: Normocephalic. No masses, lesions, tenderness or abnormalities  Eyes: conjuctiva clear, PERRL, EOM intact  Ears: External ears normal. Canals clear. TM's normal.  Nose: Nares normal  Mouth: normal  Neck: Supple, no cervical adenopathy, no thyromegaly  Lungs: clear to auscultation  Cardiac: regular rate and rhythm, normal S1 and S2, cONDUCTED FISTULA MURMUR.  Abdomen: Soft, nontender.  Normal bowel sounds.  No hepatosplenomegaly or abnormal masses  Extremities: no peripheral edema, peripheral pulses normal  Musculoskeletal: negative  Neurological: Cranial nerves 2-12 intact, motor strength intact       ASSESSMENT/plan:zs  Patient here for renal transplant evaluation.  CKD stage V due to type 2 diabetes.  Diabetic for over 14 years.  Not on dialysis yet he do have a fistula in " place.  Not very active denied cardiac symptoms.  Cardiac risk factor profile includes hypertension and type 2 diabetes.  Ex-smoker.  Lipids unknown.  Reviewed EKG normal sinus rhythm normal.  Echocardiogram reviewed normal LV RV function no significant valvular abnormality.  As patient do not have a donor he will be waiting for 6 years for a transplant.  We will do a dobutamine stress echo and place the patient on the waiting list to extremely time.  As he has long history of type 2 diabetes may plan to do an angiogram once he is on dialysis.  Plan discussed with patient and he is agreeable.  Per orders.   Return to Clinic 1 yr.    Cardiac Testing: Patient given instructions regarding dobutamine stress echo. Discussed purpose, preparation, procedure and when to expect results reported back to the patient. Patient demonstrated understanding of this information and agreed to call with further questions or concerns.  Patient stated he understood all health information given and agreed to call with further questions or concerns.      Please do not hesitate to contact me if you have any questions/concerns.     Sincerely,     YOHANA Banerjee MD

## 2019-03-26 NOTE — NURSING NOTE
Chief Complaint   Patient presents with     New Patient     Type of visit : kidney transplant eval     Vitals were taken and medications were reconciled.    TU Lambert  10:17 AM

## 2019-03-26 NOTE — PROGRESS NOTES
Cardiac Testing: Patient given instructions regarding dobutamine stress echo. Discussed purpose, preparation, procedure and when to expect results reported back to the patient. Patient demonstrated understanding of this information and agreed to call with further questions or concerns.  Patient stated he understood all health information given and agreed to call with further questions or concerns.

## 2019-03-26 NOTE — PROGRESS NOTES
SUBJECTIVE:  Santos Galloway is a 48 year old male who presents for renal transplant evaluation.  CKD stage V, not on dialysis yet.  Do have a fistula in place.  Diabetic for 14 years.  Currently not very active denied cardiac symptoms.  Hypertensive.  Lipids unknown.  Do not not on any diabetic medications currently due to CKD.  Ex-smoker.  No premature CAD in family.    Patient Active Problem List    Diagnosis Date Noted     Type 2 diabetes mellitus (H)      Priority: Medium     Hypertension      Priority: Medium     CKD (chronic kidney disease) stage 5, GFR less than 15 ml/min (H)      Priority: Medium     Anxiety      Priority: Medium     Peripheral arterial disease (H)      Priority: Medium    .  Current Outpatient Medications   Medication Sig     calcitRIOL (ROCALTROL) 0.25 MCG capsule      calcium carbonate (TUMS) 500 MG chewable tablet 2 tabs at bedtime     darbepoetin sanchez (ARANESP) 25 MCG/0.42ML injection Every 2 weeks per CRF guidelines - restart when hemoglobin is less then or equal to 11.6.     hydrALAZINE (APRESOLINE) 50 MG tablet Take 50 mg by mouth     lisinopril (PRINIVIL/ZESTRIL) 20 MG tablet      omeprazole (PRILOSEC) 20 MG DR capsule Take 20 mg by mouth     sertraline (ZOLOFT) 25 MG tablet      simvastatin (ZOCOR) 20 MG tablet Take 20 mg by mouth     torsemide (DEMADEX) 10 MG tablet Take 10 mg by mouth     vitamin B-12 (CYANOCOBALAMIN) 1000 MCG tablet Take 1,000 mcg by mouth     No current facility-administered medications for this visit.      Past Medical History:   Diagnosis Date     Anxiety      Cataract      CKD (chronic kidney disease) stage 5, GFR less than 15 ml/min (H)      Gastroparesis      GERD (gastroesophageal reflux disease)      Glaucoma      Hypertension      Peripheral arterial disease (H)      Type 2 diabetes mellitus (H)      Past Surgical History:   Procedure Laterality Date     CIRCUMCISION       CREATE FISTULA ARTERIOVENOUS UPPER EXTREMITY       No Known Allergies  Social  History     Socioeconomic History     Marital status:      Spouse name: Not on file     Number of children: Not on file     Years of education: Not on file     Highest education level: Not on file   Occupational History     Not on file   Social Needs     Financial resource strain: Not on file     Food insecurity:     Worry: Not on file     Inability: Not on file     Transportation needs:     Medical: Not on file     Non-medical: Not on file   Tobacco Use     Smoking status: Never Smoker     Smokeless tobacco: Never Used   Substance and Sexual Activity     Alcohol use: No     Drug use: No     Sexual activity: Not on file   Lifestyle     Physical activity:     Days per week: Not on file     Minutes per session: Not on file     Stress: Not on file   Relationships     Social connections:     Talks on phone: Not on file     Gets together: Not on file     Attends Hoahaoism service: Not on file     Active member of club or organization: Not on file     Attends meetings of clubs or organizations: Not on file     Relationship status: Not on file     Intimate partner violence:     Fear of current or ex partner: Not on file     Emotionally abused: Not on file     Physically abused: Not on file     Forced sexual activity: Not on file   Other Topics Concern     Parent/sibling w/ CABG, MI or angioplasty before 65F 55M? Not Asked   Social History Narrative     Not on file     Family History   Problem Relation Age of Onset     Diabetes Mother      Kidney Disease Mother      Diabetes Maternal Grandmother      Diabetes Maternal Grandfather      Diabetes Paternal Grandmother      Coronary Artery Disease Maternal Uncle           REVIEW OF SYSTEMS:  General: negative, fever, chills, night sweats  Skin: negative, acne, rash and scaling  Eyes: negative, double vision, eye pain and photophobia  Ears/Nose/Throat: negative, nasal congestion and purulent rhinorrhea  Respiratory: No dyspnea on exertion, No cough, No hemoptysis and  "negative  Cardiovascular: negative, palpitations, tachycardia, irregular heart beat, chest pain, exertional chest pain or pressure, paroxysmal nocturnal dyspnea, dyspnea on exertion and orthopnea  Gastrointestinal: negative, dysphagia, nausea and vomiting  Genitourinary: negative and frequency  Musculoskeletal: negative, fracture, back pain, neck pain and arthritis  Neurologic: negative, headaches, syncope, stroke, seizures and paralysis  Psychiatric: negative, nervous breakdown, thoughts of self-harm and thoughts of hurting someone else  Hematologic/Lymphatic/Immunologic: negative, bleeding disorder, chills and fever  Endocrine: negative, cold intolerance, heat intolerance and hot flashes       OBJECTIVE:  Blood pressure 139/73, pulse 86, height 1.6 m (5' 3\"), weight 65.4 kg (144 lb 3.2 oz), SpO2 98 %.  General Appearance: alert and no distress  Head: Normocephalic. No masses, lesions, tenderness or abnormalities  Eyes: conjuctiva clear, PERRL, EOM intact  Ears: External ears normal. Canals clear. TM's normal.  Nose: Nares normal  Mouth: normal  Neck: Supple, no cervical adenopathy, no thyromegaly  Lungs: clear to auscultation  Cardiac: regular rate and rhythm, normal S1 and S2, cONDUCTED FISTULA MURMUR.  Abdomen: Soft, nontender.  Normal bowel sounds.  No hepatosplenomegaly or abnormal masses  Extremities: no peripheral edema, peripheral pulses normal  Musculoskeletal: negative  Neurological: Cranial nerves 2-12 intact, motor strength intact       ASSESSMENT/plan:zs  Patient here for renal transplant evaluation.  CKD stage V due to type 2 diabetes.  Diabetic for over 14 years.  Not on dialysis yet he do have a fistula in place.  Not very active denied cardiac symptoms.  Cardiac risk factor profile includes hypertension and type 2 diabetes.  Ex-smoker.  Lipids unknown.  Reviewed EKG normal sinus rhythm normal.  Echocardiogram reviewed normal LV RV function no significant valvular abnormality.  As patient do not " have a donor he will be waiting for 6 years for a transplant.  We will do a dobutamine stress echo and place the patient on the waiting list to extremely time.  As he has long history of type 2 diabetes may plan to do an angiogram once he is on dialysis.  Plan discussed with patient and he is agreeable.  Per orders.   Return to Clinic 1 yr.      Reviewed stress test. Normal at 78% MPHR. Patient may be placed on active list,but need an angiogram once he is on HD.

## 2019-04-05 ENCOUNTER — TELEPHONE (OUTPATIENT)
Dept: CARDIOLOGY | Facility: CLINIC | Age: 49
End: 2019-04-05

## 2019-04-05 NOTE — TELEPHONE ENCOUNTER
[facility] Call Center    Phone Message    May a detailed message be left on voicemail: yes    Reason for Call: Other: Codie calling from [facility] in regards to orders that were faxed over. Codie just wants to clarify which test pt should be having as the notes state a NM Dobutamine Stress Echo several times which she states is a different test. Please give Codie a call back to discuss.      Action Taken: Message routed to:  [facility] ([facility]): Cardiology

## 2019-04-08 DIAGNOSIS — I73.9 PERIPHERAL ARTERIAL DISEASE (H): ICD-10-CM

## 2019-04-08 DIAGNOSIS — Z76.82 ORGAN TRANSPLANT CANDIDATE: Primary | ICD-10-CM

## 2019-04-08 DIAGNOSIS — I10 ESSENTIAL HYPERTENSION: ICD-10-CM

## 2019-04-08 NOTE — TELEPHONE ENCOUNTER
Spoke to Codie, verified correct order is for dobutamine stress echo.  Wrong order was sent previously. Old order discontinued, correct order placed, printed, faxed to jaky given. Called to verify receipt; had not been received.  Given alternate fax number; sent to that number. Called to verify receipt - it was received.

## 2019-04-09 ENCOUNTER — TRANSFERRED RECORDS (OUTPATIENT)
Dept: HEALTH INFORMATION MANAGEMENT | Facility: CLINIC | Age: 49
End: 2019-04-09

## 2019-04-11 ENCOUNTER — TELEPHONE (OUTPATIENT)
Dept: CARDIOLOGY | Facility: CLINIC | Age: 49
End: 2019-04-11

## 2019-04-11 NOTE — TELEPHONE ENCOUNTER
Spoke to patient, let him know that Dr. Song has indicated need for coronary angiogram after dialysis has started.     He does not know when it will begin. He has appt with provider at [facility] sometime in April (he doesn't know when) to discuss the plan for that.     I asked him to call us and let us know when dialysis gets scheduled, so we can proceed with coronary angiogram.  Gave our number. He agrees with plan and states he will call when it's time.

## 2019-06-10 ENCOUNTER — TELEPHONE (OUTPATIENT)
Dept: TRANSPLANT | Facility: CLINIC | Age: 49
End: 2019-06-10

## 2019-06-10 NOTE — TELEPHONE ENCOUNTER
Coordinator called pt to let him Dr. Song reviewed his outside stress test and cleared him for transplant. Instructed pt to call if he were to go on dialysis, as Dr. Song would like pt to complete an angiogram at this time.    Pt still has not seen a dentist and is not sure if he'll need dental work completed. Reviewed after dental is completed, insurance is requiring a PA for transplant approval. Pt to call once dental is scheduled. Contact information provided. Pt verbalizes understanding and has no further questions.

## 2019-07-01 ENCOUNTER — TRANSFERRED RECORDS (OUTPATIENT)
Dept: HEALTH INFORMATION MANAGEMENT | Facility: CLINIC | Age: 49
End: 2019-07-01

## 2019-08-12 ENCOUNTER — TELEPHONE (OUTPATIENT)
Dept: TRANSPLANT | Facility: CLINIC | Age: 49
End: 2019-08-12

## 2019-08-12 NOTE — TELEPHONE ENCOUNTER
Spoke with patient who reports he has not made a dental appointment yet. Writer requested he let us know when he does set up dental appt. Patient verbalized understanding and has no further questions at this time.

## 2019-08-13 ENCOUNTER — DOCUMENTATION ONLY (OUTPATIENT)
Dept: TRANSPLANT | Facility: CLINIC | Age: 49
End: 2019-08-13

## 2019-09-16 ENCOUNTER — TELEPHONE (OUTPATIENT)
Dept: TRANSPLANT | Facility: CLINIC | Age: 49
End: 2019-09-16

## 2019-09-16 ENCOUNTER — DOCUMENTATION ONLY (OUTPATIENT)
Dept: TRANSPLANT | Facility: CLINIC | Age: 49
End: 2019-09-16

## 2019-09-16 NOTE — TELEPHONE ENCOUNTER
Coordinator called pt to see if he has made a dental appointment. Team has been asking pt to complete this since he was evaluated the beginning of this year. Pt states he has been trying to get a dental appointment but all offices are full and cannot get him in. Stated that we will give pt another 30 days to make an appointment. Pt does not need to complete dental in 30 days but needs to at least make an appointment or his status will be discussed at our kidney selection meeting.    Coordinator called and spoke with Dr. Mcneill's nurse to let her know pt still has not completed dental and are asking pt to at least make an appointment in the next 30 days. Nurse said their office can use [facility] for dental and will f/u with pt.

## 2019-09-17 ENCOUNTER — DOCUMENTATION ONLY (OUTPATIENT)
Dept: TRANSPLANT | Facility: CLINIC | Age: 49
End: 2019-09-17

## 2019-09-25 ENCOUNTER — TELEPHONE (OUTPATIENT)
Dept: TRANSPLANT | Facility: CLINIC | Age: 49
End: 2019-09-25

## 2019-10-10 ENCOUNTER — DOCUMENTATION ONLY (OUTPATIENT)
Dept: TRANSPLANT | Facility: CLINIC | Age: 49
End: 2019-10-10

## 2019-11-07 ENCOUNTER — TELEPHONE (OUTPATIENT)
Dept: TRANSPLANT | Facility: CLINIC | Age: 49
End: 2019-11-07

## 2020-02-24 ENCOUNTER — DOCUMENTATION ONLY (OUTPATIENT)
Dept: TRANSPLANT | Facility: CLINIC | Age: 50
End: 2020-02-24

## 2020-02-24 ENCOUNTER — TELEPHONE (OUTPATIENT)
Dept: TRANSPLANT | Facility: CLINIC | Age: 50
End: 2020-02-24

## 2020-02-24 NOTE — TELEPHONE ENCOUNTER
Pt completed angio per [facility] last July and started dialysis Aug 2019 at [facility] in [place]. Pt was unaware coordinator knew this information and thought his dialysis center would have notified transplant team. Pt states he has not made a dental appointment and is still waiting to hear back from dental clinic. Coordinator reviewed w/ pt he was going to make this appoint last Fall and pt states clinic is still full. Coordinator requested pt work with his dialysis center SW to get this appoint made. Reviewed outstanding items for active status:    1. Cardiology clearance  2. Dental: pt states he needs teeth pulled  3. Updated HLA/PRA    Requested pt call back after he speaks to dialysis SW to give coordinator an update on Dental. Contact information provided.

## 2020-03-11 ENCOUNTER — DOCUMENTATION ONLY (OUTPATIENT)
Dept: TRANSPLANT | Facility: CLINIC | Age: 50
End: 2020-03-11

## 2020-03-11 NOTE — TELEPHONE ENCOUNTER
Coordinator called and spoke with pt. Pt states he still hasn't heard back from the dentist office but is trying to make one. Reviewed that pt has stating this for months without successfully making an appoint. Requested pt make an appointment in the next 30 days or his status, including possible de-list, will be reviewed at our kidney selection meeting.     Coordinator called pt's dialysis center and spoke with the charge nurse about this plan.

## 2020-07-06 ENCOUNTER — DOCUMENTATION ONLY (OUTPATIENT)
Dept: TRANSPLANT | Facility: CLINIC | Age: 50
End: 2020-07-06

## 2020-07-30 ENCOUNTER — TELEPHONE (OUTPATIENT)
Dept: TRANSPLANT | Facility: CLINIC | Age: 50
End: 2020-07-30

## 2020-07-30 NOTE — TELEPHONE ENCOUNTER
Spoke with  Lyric from [facility] and she was wondering about a status update for patient. Informed her that Santos is currently INACTIVE on waitlist. Per Lyric, Santos believes he just needs dental to complete his active listing items. Requested call from coordinator to patient to confirm. He did have a dental appointment at [facility] in July.    (Currently not on PM)

## 2020-07-31 ENCOUNTER — TELEPHONE (OUTPATIENT)
Dept: TRANSPLANT | Facility: CLINIC | Age: 50
End: 2020-07-31

## 2020-07-31 NOTE — TELEPHONE ENCOUNTER
Spoke with patient who reports he has seen [facility] in [place] but still has two teeth that need to be extracted. Santos states he will schedule those appointments today and let us know when they are set for. Clary will update dialysis center of patient's status.

## 2020-07-31 NOTE — TELEPHONE ENCOUNTER
Updated dialysis social worker that patient still needs to complete dental work and then coordinator will review what he needs to do for active status; likely he will need cardiology f/u and stress test, and clearance note from dentist, plus updated labs. Lyric will review with patient next week when she is at his dialysis location.

## 2020-08-03 NOTE — TELEPHONE ENCOUNTER
Coordinator called dialysis Lyric CERON back and left msg. Contact information provided.    Reviewed items needed for active status consideration:  1. Dental: pt will need to complete any extractions needed and have dentist send us clearance letter    Once dental is done following will be needed:  2. Cards and stress test  3. Neph  4. SW  5. Updated HLA/PRA- we would send updated order and kits to dialysis to have drawn

## 2021-05-04 ENCOUNTER — DOCUMENTATION ONLY (OUTPATIENT)
Dept: TRANSPLANT | Facility: CLINIC | Age: 51
End: 2021-05-04

## 2021-05-05 ENCOUNTER — TELEPHONE (OUTPATIENT)
Dept: TRANSPLANT | Facility: CLINIC | Age: 51
End: 2021-05-05

## 2021-05-05 NOTE — TELEPHONE ENCOUNTER
Coordinator called pt to see if he has completed dental. Pt states he hasn't because he recently tested positive for COVID. Pt reports his 2 sons tested positive so he got tested and it came back positive. He was fully vaccinated and has been asymptomatic.     Coordinator explained he will need 2 negative COVID tests. Pt states he will arrange via dialysis or PCP. Pt is aware his coordinator can enter COVID testing too if needed.    Pt states he will schedule his dental by the end of July. Will send pt an updated letter with expectations. Pt understands if his dental isn't completed he will be reviewed at committee as he has promised several times he would complete dental and has not.

## 2021-05-10 ENCOUNTER — DOCUMENTATION ONLY (OUTPATIENT)
Dept: TRANSPLANT | Facility: CLINIC | Age: 51
End: 2021-05-10

## 2021-06-04 NOTE — TELEPHONE ENCOUNTER
Spoke to JIE Gunter at HD unit. She states patient informed her he has dental appt scheduled in July

## 2021-08-03 ENCOUNTER — TELEPHONE (OUTPATIENT)
Dept: TRANSPLANT | Facility: CLINIC | Age: 51
End: 2021-08-03

## 2021-08-03 NOTE — TELEPHONE ENCOUNTER
Writer left message on VM at Dialysis asking for the SW to call back with information on if patient has completed his dental work.

## 2021-08-11 ENCOUNTER — TELEPHONE (OUTPATIENT)
Dept: TRANSPLANT | Facility: CLINIC | Age: 51
End: 2021-08-11

## 2021-08-11 NOTE — TELEPHONE ENCOUNTER
Per Lyric at dialysis center, Santos has not gotten his dental work done at this time. Lyric suggested he be proactive and call them to set up and will keep us updated.

## 2021-08-24 ENCOUNTER — DOCUMENTATION ONLY (OUTPATIENT)
Dept: TRANSPLANT | Facility: CLINIC | Age: 51
End: 2021-08-24

## 2021-10-01 ENCOUNTER — DOCUMENTATION ONLY (OUTPATIENT)
Dept: TRANSPLANT | Facility: CLINIC | Age: 51
End: 2021-10-01

## 2021-10-06 ENCOUNTER — DOCUMENTATION ONLY (OUTPATIENT)
Dept: TRANSPLANT | Facility: CLINIC | Age: 51
End: 2021-10-06

## 2021-10-13 ENCOUNTER — DOCUMENTATION ONLY (OUTPATIENT)
Dept: TRANSPLANT | Facility: CLINIC | Age: 51
End: 2021-10-13

## 2021-11-05 ENCOUNTER — TELEPHONE (OUTPATIENT)
Dept: TRANSPLANT | Facility: CLINIC | Age: 51
End: 2021-11-05

## 2021-11-05 NOTE — TELEPHONE ENCOUNTER
JIE Gunter with dialysis unit called today to share an update on the patient. He is still working on obtaining dental clearance, appts have been rescheduled several times. She is encouraging patient to reschedule in the near future so he can be active.  I informed her I will update the waitlist team.

## 2021-11-22 ENCOUNTER — TELEPHONE (OUTPATIENT)
Dept: TRANSPLANT | Facility: CLINIC | Age: 51
End: 2021-11-22
Payer: COMMERCIAL

## 2021-11-22 DIAGNOSIS — Z76.82 ORGAN TRANSPLANT CANDIDATE: ICD-10-CM

## 2021-11-22 DIAGNOSIS — Z01.810 PRE-OPERATIVE CARDIOVASCULAR EXAMINATION: ICD-10-CM

## 2021-11-22 DIAGNOSIS — N18.6 ESRD (END STAGE RENAL DISEASE) (H): ICD-10-CM

## 2021-11-22 NOTE — TELEPHONE ENCOUNTER
Called pt to introduce self as new transplant coordinator. Informed pt he is due for cardiology appts and that it has been ordered so he should be expecting a call from our schedulers. Pt informed RNCC of dental appt in a few weeks, feels he probably will need some work done. RNCC will follow up with pt after dental appt. Gave pt direct line should questions arise.

## 2021-11-23 ENCOUNTER — TEAM CONFERENCE (OUTPATIENT)
Dept: TRANSPLANT | Facility: CLINIC | Age: 51
End: 2021-11-23
Payer: COMMERCIAL

## 2021-11-23 NOTE — TELEPHONE ENCOUNTER
Image Review Meeting    ATTENDEES: VAN HATFIELD MD, OJSIAH COOLEY RN, SEKOU HALEY RN, S YUAN RN, JAYE HIRSCH RN, JOHN SHAFFER RN, S RADHA RN    IMAGES REVIEWED: 3/26/19 CT a/p, 3/26/19 US iliacs    DECISION: Dr. Hatfield determined that vessels are suitable per 2019 imaging but would like updated imaging before active status consideration.

## 2022-01-18 ENCOUNTER — TELEPHONE (OUTPATIENT)
Dept: CARDIOLOGY | Facility: CLINIC | Age: 52
End: 2022-01-18
Payer: COMMERCIAL

## 2022-01-18 ENCOUNTER — VIRTUAL VISIT (OUTPATIENT)
Dept: CARDIOLOGY | Facility: CLINIC | Age: 52
End: 2022-01-18
Attending: INTERNAL MEDICINE
Payer: COMMERCIAL

## 2022-01-18 DIAGNOSIS — I10 BENIGN ESSENTIAL HYPERTENSION: Primary | ICD-10-CM

## 2022-01-18 DIAGNOSIS — Z76.82 ORGAN TRANSPLANT CANDIDATE: ICD-10-CM

## 2022-01-18 DIAGNOSIS — Z01.810 PRE-OPERATIVE CARDIOVASCULAR EXAMINATION: ICD-10-CM

## 2022-01-18 DIAGNOSIS — N18.6 ESRD (END STAGE RENAL DISEASE) (H): ICD-10-CM

## 2022-01-18 DIAGNOSIS — E78.5 HYPERLIPIDEMIA LDL GOAL <100: ICD-10-CM

## 2022-01-18 PROCEDURE — 99214 OFFICE O/P EST MOD 30 MIN: CPT | Mod: 95 | Performed by: INTERNAL MEDICINE

## 2022-01-18 NOTE — TELEPHONE ENCOUNTER
Discussed coronary angiogram with patient and confirmed that I sent letter via [employer] with all info, once received he will review and call me for discussion and scheduling. Patient understands and agrees with plan.

## 2022-01-18 NOTE — PROGRESS NOTES
"Santos is a 51 year old who is being evaluated via a billable telephone visit.      What phone number would you like to be contacted at? [phone]  How would you like to obtain your AVS? Mail a copy  Phone call duration: 12 minutes    Prabha Perry Visit Facilitator/MA.    Subjective   Santos is a 51 year old who presents for evaluation to maintain renal transplant waitlist status.  HPI   Patient with CKD stage V.  CKD due to type 2 diabetes.  Patient is a diabetic for over 20 years.  Started on hemodialysis 2 years ago.  Currently patient do not have any potential donors.  His cardiac risk factors are hypertension, hyperlipidemia, remote history of smoking and prolonged duration of type 2 diabetes.  No premature coronary artery disease in family.  Currently patient is not very active due to renal disease.  Denied chest pain shortness of breath or heart failure symptoms.      Review of Systems   Constitutional, HEENT, cardiovascular, pulmonary, gi and gu systems are negative, except as otherwise noted.      Objective    Vitals - Patient Reported  Weight (Patient Reported): 72 kg (158 lb 11.7 oz)  Height (Patient Reported): 160 cm (5' 3\")  BMI (Based on Pt Reported Ht/Wt): 28.12  Pain Score: No Pain (0)        Physical Exam   Not done.    ASSESSMENT/PLAN:  Patient here for evaluation to maintain renal transplant waitlist status.  CKD stage V due to type 2 diabetes.  Diabetic for around 20 years.  Started on hemodialysis 2 years ago.  Currently patient is not very active due to CKD.  Denied cardiac symptoms.  Patient's cardiac risk factors are hypertension, hyperlipidemia both on treatment.  Type 2 diabetes for about 20 years and prior history of smoking.  Due to decreased functional capacity difficult to assess symptoms.  Due to his risk factor profile including hypertension, hyperlipidemia, prolonged duration of type 2 diabetes and history of smoking we will plan for a coronary angiogram.  This will be arranged " soon as possible.  Patient will be cleared if there is no flow-limiting coronary arteries.  Prior records reviewed.  2019 patient was evaluated for renal transplant.  At that time he had a normal dobutamine stress echo though reached only 78% maximum predicted heart rate.  As needed.    Total visit duration 30 minutes.  This includes telephone interview, chart review, review of Care Everywhere, stress test result, and documentation.

## 2022-01-18 NOTE — LETTER
"1/18/2022      RE: Santos Galloway  [address] [address]       Dear Colleague,    Thank you for the opportunity to participate in the care of your patient, Santos Galloway, at the [facility] [place] [facility]. Please see a copy of my visit note below.      Lokesh Graham is a 51 year old who presents for evaluation to maintain renal transplant waitlist status.  HPI   Patient with CKD stage V.  CKD due to type 2 diabetes.  Patient is a diabetic for over 20 years.  Started on hemodialysis 2 years ago.  Currently patient do not have any potential donors.  His cardiac risk factors are hypertension, hyperlipidemia, remote history of smoking and prolonged duration of type 2 diabetes.  No premature coronary artery disease in family.  Currently patient is not very active due to renal disease.  Denied chest pain shortness of breath or heart failure symptoms.      Review of Systems   Constitutional, HEENT, cardiovascular, pulmonary, gi and gu systems are negative, except as otherwise noted.      Objective    Vitals - Patient Reported  Weight (Patient Reported): 72 kg (158 lb 11.7 oz)  Height (Patient Reported): 160 cm (5' 3\")  BMI (Based on Pt Reported Ht/Wt): 28.12  Pain Score: No Pain (0)        Physical Exam   Not done.    ASSESSMENT/PLAN:  Patient here for evaluation to maintain renal transplant waitlist status.  CKD stage V due to type 2 diabetes.  Diabetic for around 20 years.  Started on hemodialysis 2 years ago.  Currently patient is not very active due to CKD.  Denied cardiac symptoms.  Patient's cardiac risk factors are hypertension, hyperlipidemia both on treatment.  Type 2 diabetes for about 20 years and prior history of smoking.  Due to decreased functional capacity difficult to assess symptoms.  Due to his risk factor profile including hypertension, hyperlipidemia, prolonged duration of type 2 diabetes and history of smoking we will " plan for a coronary angiogram.  This will be arranged soon as possible.  Patient will be cleared if there is no flow-limiting coronary arteries.  Prior records reviewed.  2019 patient was evaluated for renal transplant.  At that time he had a normal dobutamine stress echo though reached only 78% maximum predicted heart rate.  As needed.    Total visit duration 30 minutes.  This includes telephone interview, chart review, review of Care Everywhere, stress test result, and documentation.        Please do not hesitate to contact me if you have any questions/concerns.     Sincerely,     YOHANA Banerjee MD

## 2022-01-18 NOTE — NURSING NOTE
Chief Complaint   Patient presents with     Follow Up     Kidney Waitlist. Reviewed medications with pt, states he is no longer taking calcium.

## 2022-01-18 NOTE — PATIENT INSTRUCTIONS
Per your conversation with Dr. Song, you are being referred for a diagnostic procedure called a coronary angiogram.    Details about the procedure has been sent to you via postal mail.  Please carefully read it and follow the instructions within.

## 2022-01-25 ENCOUNTER — TELEPHONE (OUTPATIENT)
Dept: TRANSPLANT | Facility: CLINIC | Age: 52
End: 2022-01-25
Payer: COMMERCIAL

## 2022-03-10 ENCOUNTER — TELEPHONE (OUTPATIENT)
Dept: TRANSPLANT | Facility: CLINIC | Age: 52
End: 2022-03-10
Payer: COMMERCIAL

## 2022-03-10 NOTE — TELEPHONE ENCOUNTER
Called pt to check in on scheduling angiogram and dental. Pt reports he is looking to get dental done in the next few weeks. Pt has not heard from Cardiology regarding angio. RNCC will reach out to cards to schedule. Pt will update RNCC once dental is done.

## 2022-07-15 ENCOUNTER — TELEPHONE (OUTPATIENT)
Dept: TRANSPLANT | Facility: CLINIC | Age: 52
End: 2022-07-15

## 2022-07-15 NOTE — TELEPHONE ENCOUNTER
Called pt to check in on angiogram scheduling. Pt has not arranged yet. RNCC sent email to pt to call to arrange. Pt verbalized understanding of information and has no further questions. Encouraged to reach out if questions arise.

## 2022-09-12 ENCOUNTER — TELEPHONE (OUTPATIENT)
Dept: TRANSPLANT | Facility: CLINIC | Age: 52
End: 2022-09-12

## 2022-09-12 NOTE — TELEPHONE ENCOUNTER
Called  center SW to update pt has not arranged angiogram needed for transplant work up. Maria Elena, JAYNE CERON, will discuss with patient today. Faxed angiogram letter to [facility] for patient to review. Gave direct line to Maria Elena for return call after she checks in with the patient.

## 2023-02-23 ENCOUNTER — TELEPHONE (OUTPATIENT)
Dept: TRANSPLANT | Facility: CLINIC | Age: 53
End: 2023-02-23
Payer: COMMERCIAL

## 2023-02-23 NOTE — TELEPHONE ENCOUNTER
Called pt to check in has had multiple hospital visits over the past few months. Had a CABG in October, needs DAPT for 1 year. Pt reports he also needs dental extractions. Will review chart and bring in for updated visits with transplant around October/ November. Pt verbalized understanding of information and has no further questions. Encouraged to reach out if questions arise.

## 2023-10-26 ENCOUNTER — TELEPHONE (OUTPATIENT)
Dept: TRANSPLANT | Facility: CLINIC | Age: 53
End: 2023-10-26
Payer: MEDICARE

## 2023-10-26 NOTE — TELEPHONE ENCOUNTER
Called pt to check in. Has had a few ER visits over the past few months but no major medical changed. Reports dental is not done and he is working on it. Reviewed should have risk assessment with cards and review anticoag plan then will bring in for updated visits with transplant office. Pt will keep writer updated with dental when completed. Pt reports he does not know what anticoag he is on. Will connect with cards clinic.     Called [facility] clinic to see if they can arrange risk assessment visit with pt. Also wanting update on what anticoag pt is on. Left  with direct line for return call.     10/27/23 addendum: [facility] clinic returned call. Pt okay to hold plavix in the event he gets called in for transplant, should resume when stable after surgery. They will arrange risk assessment with pt. . Pt to notify writer when visit is scheduled.

## 2023-12-07 ENCOUNTER — TELEPHONE (OUTPATIENT)
Dept: TRANSPLANT | Facility: CLINIC | Age: 53
End: 2023-12-07
Payer: MEDICARE

## 2024-02-05 ENCOUNTER — TELEPHONE (OUTPATIENT)
Dept: TRANSPLANT | Facility: CLINIC | Age: 54
End: 2024-02-05
Payer: MEDICARE

## 2024-02-05 NOTE — TELEPHONE ENCOUNTER
Called pt to check in if cards visit has been completed. Left VM with direct line for return call.

## 2024-02-22 ENCOUNTER — DOCUMENTATION ONLY (OUTPATIENT)
Dept: TRANSPLANT | Facility: CLINIC | Age: 54
End: 2024-02-22
Payer: MEDICARE

## 2024-02-26 ENCOUNTER — TELEPHONE (OUTPATIENT)
Dept: TRANSPLANT | Facility: CLINIC | Age: 54
End: 2024-02-26
Payer: MEDICARE

## 2024-02-26 NOTE — TELEPHONE ENCOUNTER
Discussed mild cataracts in both eyes that are not affecting vision and are not surgical at this time. Copy of RX for separate distance and reading glasses per patient's choice. Received call back from pt. Reviewed still needs cards visit, reports he hasn't heard from anyone to schedule. Requesting clinic number via email. Updated email address and emergency contact in epic. Once cards clearance obtained will bring in for RWL visits. Pt to notify RNCC once cards visit is scheduled. Pt verbalized understanding of information and has no further questions. Encouraged to reach out if questions arise.     Email sent with [facility] cards clinic number.

## 2024-03-09 NOTE — TELEPHONE ENCOUNTER
Pt called to state he has called a few dental clinics but they are all full until March. Pt stated one dental clinic should be calling him back with appointment information. Pt to call coordinator once appoint has been arranged.    No

## 2024-04-25 ENCOUNTER — TELEPHONE (OUTPATIENT)
Dept: TRANSPLANT | Facility: CLINIC | Age: 54
End: 2024-04-25
Payer: MEDICARE

## 2024-04-25 NOTE — TELEPHONE ENCOUNTER
Called pt to check in on cards visit. Pt reports he spoke to them last week and they haven't been able to fit him in clinic yet. He is awaiting a call back. Writer encouraged pt to call clinic back to try to schedule. Pt verbalized understanding of information and has no further questions. Encouraged to reach out if questions arise.     Called HD center to update on transplant status. Left  with direct line for return call.

## 2024-05-22 ENCOUNTER — TELEPHONE (OUTPATIENT)
Dept: TRANSPLANT | Facility: CLINIC | Age: 54
End: 2024-05-22
Payer: MEDICARE

## 2024-05-22 NOTE — TELEPHONE ENCOUNTER
----- Message from Francia Timmons PA-C sent at 5/22/2024  9:25 AM CDT -----  Triglycerides elevated but improved from previously. hemoglobin A1c is prediabetic range at 6.3% can refer to nutritionist. We will continue to monitor. If it reaches 6.5% that is diagnostic for diabetes.    Called pt to check in on dental. Pt states he has not had appt yet but will be rescheduling it soon. Also asked if he has angio set up. Pt states order was old so they needed to update that and he is waiting to hear back from cardiology.

## 2024-06-17 ENCOUNTER — TELEPHONE (OUTPATIENT)
Dept: TRANSPLANT | Facility: CLINIC | Age: 54
End: 2024-06-17
Payer: MEDICARE

## 2024-06-17 NOTE — TELEPHONE ENCOUNTER
Called pt to touch base on cards follow up. Pt answered then phone went silent. Called pt back. Left  with direct line for return call.

## 2024-07-09 ENCOUNTER — TELEPHONE (OUTPATIENT)
Dept: TRANSPLANT | Facility: CLINIC | Age: 54
End: 2024-07-09
Payer: MEDICARE

## 2024-07-09 DIAGNOSIS — N18.6 ESRD (END STAGE RENAL DISEASE) (H): Primary | ICD-10-CM

## 2024-07-09 DIAGNOSIS — I10 HYPERTENSION: ICD-10-CM

## 2024-07-09 DIAGNOSIS — Z12.5 PROSTATE CANCER SCREENING: ICD-10-CM

## 2024-07-09 DIAGNOSIS — Z76.82 ORGAN TRANSPLANT CANDIDATE: ICD-10-CM

## 2024-07-09 NOTE — TELEPHONE ENCOUNTER
Called pt to touch base on cards follow up. Pt reports he tried to contact cards clinic but was told appts were too far out so was not able to arrange. Has not been to dentist in a few years as it has gotten backed up since covid, reviewed pt should make appt asap even if it is far out. Will place orders for RWL w/ cards.       Called HD center SW to discuss compliance. Left  with direct line for return call.

## 2024-07-11 ENCOUNTER — TELEPHONE (OUTPATIENT)
Dept: TRANSPLANT | Facility: CLINIC | Age: 54
End: 2024-07-11
Payer: MEDICARE

## 2024-07-11 NOTE — TELEPHONE ENCOUNTER
"Email from  SW: \"  Thanks for reaching out! Dialysis rey, Santos does very well for the most part with compliance, staying the whole tx. time, not missing, etc.    I'm really hoping he gets to these re-evaluation appts. I talked with him last week about scheduling with cardiology, and he seems very defeated about the waitlist being so long, so he states he doesn't even put himself on their schedule because 'its so booked out'. I did encourage the sooner he could at least get something on the schedule, he would at least have an appt. booked. Would him seeing your cardiologist suffice for what he needs to be checked for?    Thanks again for connecting. Let me know if there is anything else you want to discuss or if something comes up that I can try and help with, please let me know!\"     Reviewed pt is due for dental and colonoscopy. Will complete risk assessment with our team but may need to continue to follow with general cardiology.     "

## 2024-07-15 NOTE — PROGRESS NOTES
The patient did not show up for their appointment and/or the appointment was rescheduled. Encounter closed for administrative reasons. JOSEPH Stevenson CNP on 8/27/2024 at 3:50 PM

## 2024-07-16 DIAGNOSIS — Z76.82 ORGAN TRANSPLANT CANDIDATE: ICD-10-CM

## 2024-07-16 DIAGNOSIS — Z01.810 PRE-OPERATIVE CARDIOVASCULAR EXAMINATION: ICD-10-CM

## 2024-07-16 DIAGNOSIS — N18.6 ESRD (END STAGE RENAL DISEASE) (H): Primary | ICD-10-CM

## 2024-07-16 NOTE — PROGRESS NOTES
Called pt to update orders placed for echo prior to cards visit on 8/7/24. Pt verbalized understanding of information and has no further questions. Encouraged to reach out if questions arise.

## 2024-08-05 NOTE — PROGRESS NOTES
TRANSPLANT NEPHROLOGY WAITLIST VISIT    Assessment and Plan:  # Kidney Transplant Wait List Evaluation: Patient is a fair candidate overall, still in evaluation phase and needing the following pending items:     Recommendations:  - cardiac risk assessment with left and right heart cath   - noncon MRI abd for indeterminate renal lesions.   - get colonoscopy records  - dental check, may need some teeth extracted     # ESKD from DM II / HTN: doing OK on dialysis since     #Type 2 diabetes: diet controlled since starting dialysis, A1c 5.7%.     # Cardiac Risk: NSTEMI s/p CABG x2 10/2022. 2022 ECHO today with LVEF is 57%. Grade III or advanced diastolic dysfunction and PASP 50-75 mmHg. Needs risk assessment  with left and right heart cath.     # PAD screening: CT and iliacs available for surgery review - showed moderate disease.     # Indeterminate renal lesions on CT: needs noncon MRI abd.     # Former smoker: 10-15 pack-year, quit in 20 + years ago. Denies dyspnea.     # Health Maintenance: Colonoscopy: reportedly up to date - but need records and Dental: Not up to date (may need some teeth extracted)     Discussed the risks and benefits of a transplant, including the risk of surgery and immunosuppression medications.    KDPI: We discussed approximate remaining wait time and how that is influenced by issues such as blood type and sensitization (PRA) and access to a living donor. I contrasted potential waiting time for living vs  donor kidneys from  normal (0-85%) or higher (%) kidney donor profile index (KDPI) donors and their associated outcomes. I would recommend Mr. Galloway to consider kidneys from high KDPI donors. The reason for this decision is best summarized as: decreased dialysis related morbidity/mortality, accepting lower kidney graft survival rates.    Patient presently appears to be enough of an acceptable kidney transplant recipient candidate to have any potential kidney donors start the  evaluation process.  Patient s overall re-evaluation may require further discussion in the Transplant Program s multidisciplinary selection committee for a final recommendation on the patient s suitability for transplant.     Reason for Visit:  Santos Galloway is a 54 year old male with ESKD from DMII / HTN , who presents for kidney transplant wait list evaluation.     Date of Initial Transplant Evaluation:  1/2019         Current Transplant Phase: Waitlist: Inactive  Official UNOS Listing Date: 1/25/2019  Blood Type: O        cPRA: 0%       Date of cPRA: 2019   Transplant Coordinator: Karen Yan Transplant Office phone number [phone]       History of Present Illness:            Interim Events:     - NSTEMI in 7/2019 S/p PCI and started hemodialysis     - admitted with fluid overload issues in 4/2022.     - S/p CABG x 2V in 10/2022.     - admitted with SOB, CT showed LLL atelectasis and small bilateral effusions. He was treated with antibiotics.     - short admission with parainfluenza in 6/2024, Received supportive care and required O2 but imaging without significant lung infiltrate.          Kidney Disease:        Kidney Disease Dx: DM II / HTN        On Dialysis: Yes, Date initiated: 7/2019  and Dialysis Type: [facility];, reports BPs controlled, no longer making urine, AVF aneurysmal on exam. Nausea every now and then.        Primary Nephrologist: Dr. Olivarez         Diabetes: stopped insulin after starting dialysis.        Diabetic Control: Controlled (HbA1c <7%)      Last HbA1c: 5.7%       Hypoglycemic Unawareness: No       New Issues: No       Cardiac/Vascular Disease History:       Known CAD: Yes, NSTEMI s/p CABG x2 10/2022.         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 Lives in a house with his wife. Son lives close by. Able to walk a few blocks OK. Can go up and down stairs at home without chest pains or shortness of breath.     #Identified Care Partner Post Transplant Surgery: wife - not here today.       Allergy Testing Questions:   Medication that caused a reaction None   Antibiotics used that didn't give an allergic reaction?  Patient doesn't know    COVID Vaccination Up To Date: Not asked      Other Pertinent Transplant Surgical Issues:  Recent Blood Transfusion: No  Previous Abdominal Transplant: No  Bladder Dysfunction: No  Chronic/Recurrent Infections: No  Chronic Anticoagulation: No  Jehovah s Witness: No       Active Problem List:   Patient Active Problem List   Diagnosis    Type 2 diabetes mellitus (H)    Hypertension    CKD (chronic kidney disease) stage 5, GFR less than 15 ml/min (H)    Anxiety    Peripheral arterial disease (H24)       Personal History:  Former smoker      Allergies:  No Known Allergies     Medications:  Current Outpatient Medications   Medication Sig Dispense Refill    calcitRIOL (ROCALTROL) 0.25 MCG capsule       calcium carbonate (TUMS) 500 MG chewable tablet 2 tabs at bedtime (Patient not taking: Reported on 1/18/2022)      hydrALAZINE (APRESOLINE) 50 MG tablet Take 50 mg by mouth      lisinopril (PRINIVIL/ZESTRIL) 20 MG tablet       omeprazole (PRILOSEC) 20 MG DR capsule Take 20 mg by mouth      sertraline (ZOLOFT) 25 MG tablet       simvastatin (ZOCOR) 20 MG tablet Take 20 mg by mouth      torsemide (DEMADEX) 10 MG tablet Take 10 mg by mouth      vitamin B-12 (CYANOCOBALAMIN) 1000 MCG tablet Take 1,000 mcg by mouth       No current facility-administered medications for this visit.        Vitals:  There were no vitals taken for this visit.     Exam:  GENERAL APPEARANCE: alert and no distress  HENT: mouth without ulcers or lesions  LYMPHATICS: no cervical or supraclavicular nodes  RESP: lungs  clear to auscultation - no rales, rhonchi or wheezes  CV: regular rhythm, normal rate, no rub, no murmur  EDEMA: no LE edema bilaterally  ABDOMEN: soft, nondistended, nontender, bowel sounds normal  MS: extremities normal - no gross deformities noted, no evidence of inflammation in joints, no muscle tenderness  SKIN: no rash

## 2024-08-06 ENCOUNTER — ANCILLARY PROCEDURE (OUTPATIENT)
Dept: ULTRASOUND IMAGING | Facility: CLINIC | Age: 54
End: 2024-08-06
Attending: NURSE PRACTITIONER
Payer: MEDICARE

## 2024-08-06 ENCOUNTER — ANCILLARY PROCEDURE (OUTPATIENT)
Dept: CARDIOLOGY | Facility: CLINIC | Age: 54
End: 2024-08-06
Payer: MEDICARE

## 2024-08-06 ENCOUNTER — OFFICE VISIT (OUTPATIENT)
Dept: TRANSPLANT | Facility: CLINIC | Age: 54
End: 2024-08-06
Attending: NURSE PRACTITIONER
Payer: MEDICARE

## 2024-08-06 ENCOUNTER — ANCILLARY PROCEDURE (OUTPATIENT)
Dept: CT IMAGING | Facility: CLINIC | Age: 54
End: 2024-08-06
Attending: NURSE PRACTITIONER
Payer: MEDICARE

## 2024-08-06 ENCOUNTER — LAB (OUTPATIENT)
Dept: LAB | Facility: CLINIC | Age: 54
End: 2024-08-06
Attending: NURSE PRACTITIONER
Payer: MEDICARE

## 2024-08-06 VITALS
SYSTOLIC BLOOD PRESSURE: 148 MMHG | DIASTOLIC BLOOD PRESSURE: 74 MMHG | HEART RATE: 59 BPM | OXYGEN SATURATION: 93 % | BODY MASS INDEX: 23.74 KG/M2 | WEIGHT: 134 LBS

## 2024-08-06 DIAGNOSIS — I15.1 HYPERTENSION SECONDARY TO OTHER RENAL DISORDERS: ICD-10-CM

## 2024-08-06 DIAGNOSIS — Z01.810 PRE-OPERATIVE CARDIOVASCULAR EXAMINATION: ICD-10-CM

## 2024-08-06 DIAGNOSIS — N18.6 ESRD (END STAGE RENAL DISEASE) (H): ICD-10-CM

## 2024-08-06 DIAGNOSIS — Z12.5 PROSTATE CANCER SCREENING: ICD-10-CM

## 2024-08-06 DIAGNOSIS — Z76.82 ORGAN TRANSPLANT CANDIDATE: ICD-10-CM

## 2024-08-06 DIAGNOSIS — N28.9 RENAL LESION: Primary | ICD-10-CM

## 2024-08-06 DIAGNOSIS — I10 HYPERTENSION: ICD-10-CM

## 2024-08-06 DIAGNOSIS — I10 HYPERTENSION, UNSPECIFIED TYPE: ICD-10-CM

## 2024-08-06 PROBLEM — H35.30 AGE-RELATED MACULAR DEGENERATION: Status: ACTIVE | Noted: 2021-09-28

## 2024-08-06 PROBLEM — F32.5 DEPRESSION, MAJOR, IN REMISSION (H): Status: ACTIVE | Noted: 2019-08-27

## 2024-08-06 PROBLEM — Z99.2 END-STAGE RENAL DISEASE ON HEMODIALYSIS (H): Status: ACTIVE | Noted: 2022-10-02

## 2024-08-06 LAB
BI-PLANE LVEF ECHO: NORMAL
CHOLEST SERPL-MCNC: 146 MG/DL
FASTING STATUS PATIENT QL REPORTED: YES
HDLC SERPL-MCNC: 57 MG/DL
LDLC SERPL CALC-MCNC: 74 MG/DL
NONHDLC SERPL-MCNC: 89 MG/DL
PSA SERPL DL<=0.01 NG/ML-MCNC: 1.07 NG/ML (ref 0–3.5)
TRIGL SERPL-MCNC: 76 MG/DL

## 2024-08-06 PROCEDURE — 93978 VASCULAR STUDY: CPT | Performed by: RADIOLOGY

## 2024-08-06 PROCEDURE — 86832 HLA CLASS I HIGH DEFIN QUAL: CPT | Performed by: NURSE PRACTITIONER

## 2024-08-06 PROCEDURE — 93306 TTE W/DOPPLER COMPLETE: CPT | Performed by: STUDENT IN AN ORGANIZED HEALTH CARE EDUCATION/TRAINING PROGRAM

## 2024-08-06 PROCEDURE — G0103 PSA SCREENING: HCPCS | Performed by: PATHOLOGY

## 2024-08-06 PROCEDURE — 86833 HLA CLASS II HIGH DEFIN QUAL: CPT | Performed by: NURSE PRACTITIONER

## 2024-08-06 PROCEDURE — G0463 HOSPITAL OUTPT CLINIC VISIT: HCPCS | Performed by: NURSE PRACTITIONER

## 2024-08-06 PROCEDURE — 99000 SPECIMEN HANDLING OFFICE-LAB: CPT | Performed by: PATHOLOGY

## 2024-08-06 PROCEDURE — 99204 OFFICE O/P NEW MOD 45 MIN: CPT | Performed by: NURSE PRACTITIONER

## 2024-08-06 PROCEDURE — 80061 LIPID PANEL: CPT | Performed by: PATHOLOGY

## 2024-08-06 PROCEDURE — G1010 CDSM STANSON: HCPCS | Performed by: RADIOLOGY

## 2024-08-06 PROCEDURE — 36415 COLL VENOUS BLD VENIPUNCTURE: CPT | Performed by: PATHOLOGY

## 2024-08-06 PROCEDURE — 74176 CT ABD & PELVIS W/O CONTRAST: CPT | Mod: MG | Performed by: RADIOLOGY

## 2024-08-06 PROCEDURE — 99214 OFFICE O/P EST MOD 30 MIN: CPT | Performed by: NURSE PRACTITIONER

## 2024-08-06 NOTE — PROGRESS NOTES
Transplant Surgery Consult Note     Medical record number: [id number]  YOB: 1970,   Consult requested  for evaluation of kidney transplant candidacy.    Assessment and Recommendations:Mr. Galloway appears to be a good candidate for kidney transplantation and has a good understanding of the risks and benefits of this approach to the management of renal failure. The following issues should be addressed prior to finalizing his transplant candidacy:     Transplant order: Mr. Galloway has End stage renal failure due to diabetes mellitus type 2 and HTN whose condition is not expected to resolve, is expected to progress, and is expected to continue to develop related comorbid conditions.  Recommend he be considered as a candidate for kidney.  Cardiology consult for cardiac risk stratification to be ordered: Yes  CT abdomen and pelvis without contrast to be ordered for assessment of vascular targets: Yes  Transplant listing labs ordered to include HLA, ABOx2, Cr, etc.  Dietician consult ordered: Yes  Social work consult ordered: Yes  Imaging reports reviewed:  yes  Radiology images reviewed:yes  Recipient suitable to move forward with work up of living donors:  Yes    ABO:  O+  Hx of CABG x2 in .  Still on plavix.  Needs cardiac clearance  -needs non contrast CT of abdomen and pelvis  Needs iliac US    The majority of our visit was spent in counselling, discussing the medical and surgical risks of kidney transplantation. We discussed approximate wait time and how that is influenced by issues such as blood type and sensitization (PRA) and access to a living donor. I contrasted potential waiting time for living vs  donor kidneys from  normal (0-85%) or higher (%) kidney donor profile index (KDPI) donors and their associated outcomes. I would recommend this individual to consider kidneys from high KDPI donors. The reason for this decision is best summarized as: patient strong preference. Potential  surgical complications of kidney transplantation include bleeding, superficial or deep wound complications (infection, hernia, lymphocele), ureteral anastomotic failure (leak or stenosis), graft thrombosis, need for reoperation and other issues such as cardiac complications, pneumonia, deep venous thrombosis, pulmonary embolism, post transplant diabetes and death. The potential for recurrent disease or need for retransplantation was also addressed. We discussed the possible need for ureteral stent (and subsequent removal), and the utility of protocol biopsy and laboratory studies to evaluate for rejection or recurrent disease. We discussed the risk of graft rejection, our center's average graft and patient survival rates, immunosuppression protocols, as well as the potential opportunity to participate in clinical trials.  We also discussed the average length of stay, recovery process, and posttransplant lab and monitoring protocol.  I emphasized the need for strict immunosuppression medication adherence and the potential for complications of immunosuppression such as skin cancer or lymphoma, as well as a very low but not zero risk of donor-derived disease transmission risks (infection, cancer). Mr. Galloway asked good questions and his candidacy will be reviewed at our Multidisciplinary Selection Committee. Thank you for the opportunity to participate in Mr. Galloway's care.      Total time: 40 minutes  Counselling time: 30 minutes        ---------------------------------------------------------------------------------------------------    HPI: Mr. Galloway has End stage renal failure due to diabetes mellitus type 2 and HTN. The patient is non-diabetic.       The patient is on dialysis.    Has potential kidney donors:  No.  Interested in participation in paired exchange if a donor is willing: No    The patient has the following pertinent history:       No    Yes  Dialysis:    []      [x] via:    right upper arm extremity     Blood Transfusion                  []      [x]  Number of units:  unsure Most recently: 2022  Pregnancy:    [x]      [] Number:       Previous Transplant:  [x]      [] Details:    Cancer    [x]      [] Comment:   Kidney stones   [x]      [] Comment:      Recurrent infections  [x]      []  Type:                  Bladder dysfunction  []      [x] Cause:  anruic  Claudication   [x]      [] Distance:    Previous Amputation  [x]      [] Cause:     Chronic anticoagulation  []      [x] Indication: on plavix  Holiness  [x]      []      Past Medical History:   Diagnosis Date    Anxiety     Cataract     CKD (chronic kidney disease) stage 5, GFR less than 15 ml/min (H)     Gastroparesis     GERD (gastroesophageal reflux disease)     Glaucoma     Hypertension     Peripheral arterial disease (H24)     Type 2 diabetes mellitus (H)     Type 2 diabetes mellitus (H)      Past Surgical History:   Procedure Laterality Date    CIRCUMCISION      CREATE FISTULA ARTERIOVENOUS UPPER EXTREMITY       Family History   Problem Relation Age of Onset    Diabetes Mother     Kidney Disease Mother     Diabetes Maternal Grandmother     Diabetes Maternal Grandfather     Diabetes Paternal Grandmother     Coronary Artery Disease Maternal Uncle      Social History     Socioeconomic History    Marital status:      Spouse name: Not on file    Number of children: Not on file    Years of education: Not on file    Highest education level: Not on file   Occupational History    Not on file   Tobacco Use    Smoking status: Never    Smokeless tobacco: Never   Substance and Sexual Activity    Alcohol use: No    Drug use: No    Sexual activity: Not on file   Other Topics Concern    Parent/sibling w/ CABG, MI or angioplasty before 65F 55M? Not Asked   Social History Narrative    Not on file     Social Determinants of Health     Financial Resource Strain: High Risk (12/28/2021)    [facility]  [facility]    Financial Resource Strain     Difficulty of Paying Living Expenses: Not on file     Difficulty of Paying Living Expenses: Not on file   Food Insecurity: Not on file   Transportation Needs: Not on file   Physical Activity: Not on file   Stress: Not on file   Social Connections: Unknown (2021)    [facility]    Social Connections     Frequency of Communication with Friends and Family: Not on file   Interpersonal Safety: Not on file   Housing Stability: Not on file       ROS:   CONSTITUTIONAL:  No fevers or chills  EYES: negative for icterus  ENT:  negative for hearing loss, tinnitus and sore throat  RESPIRATORY:  negative for cough, sputum, dyspnea  CARDIOVASCULAR:  negative for chest pain. Positive for Fatigue  Renal Insufficiency  GASTROINTESTINAL:  negative for nausea, vomiting, diarrhea or constipation  GENITOURINARY:  negative for incontinence, dysuria, bladder emptying problems  HEME:  No easy bruising  INTEGUMENT:  negative for rash and pruritus  NEURO:  Negative for headache, seizure disorder  Allergies:   No Known Allergies  Medications:  Prescription Medications as of 2024         Rx Number Disp Refills Start End Last Dispensed Date Next Fill Date Owning Pharmacy    calcitRIOL (ROCALTROL) 0.25 MCG capsule  -- -- 2018 --       Class: Historical    calcium carbonate (TUMS) 500 MG chewable tablet  -- -- 2016 --       Si tabs at bedtime    Class: Historical    hydrALAZINE (APRESOLINE) 50 MG tablet  -- -- 2018 --       Sig: Take 50 mg by mouth    Class: Historical    Route: Oral    lisinopril (PRINIVIL/ZESTRIL) 20 MG tablet  -- -- 2018 --       Class: Historical    omeprazole (PRILOSEC) 20 MG DR capsule  -- -- 2018 --       Sig: Take 20 mg by mouth    Class: Historical    Route: Oral    sertraline (ZOLOFT) 25 MG tablet  -- -- 2018  --       Class: Historical    simvastatin (ZOCOR) 20 MG tablet  -- -- 12/17/2018 --       Sig: Take 20 mg by mouth    Class: Historical    Route: Oral    torsemide (DEMADEX) 10 MG tablet  -- -- 5/7/2018 --       Sig: Take 10 mg by mouth    Class: Historical    Route: Oral    vitamin B-12 (CYANOCOBALAMIN) 1000 MCG tablet  -- -- 11/25/2015 --       Sig: Take 1,000 mcg by mouth    Class: Historical    Route: Oral          Exam:     BP (!) 148/74   Pulse 59   Wt 60.8 kg (134 lb)   SpO2 93%   BMI 23.74 kg/m    Appearance: in no apparent distress.   Skin: normal  Eyes:  no redness or discharge.  Sclera anicteric  Head and Neck: Normal, no rashes or jaundice  Respiratory: easy respirations, no audible wheezing.  Abdomen: rounded, Normal bowel sounds   Extremities: femoral 2+/1+, Edema, none  Neuro: without deficit   Psychiatric: Normal mood and affect    Diagnostics:   Recent Results (from the past 672 hour(s))   Echocardiogram Complete    Collection Time: 08/06/24 10:43 AM   Result Value Ref Range    Biplane LVEF 57%    Prostate spec antigen screen [[id number]]    Collection Time: 08/06/24 11:23 AM   Result Value Ref Range    Prostate Specific Antigen Screen 1.07 0.00 - 3.50 ng/mL   Lipid panel reflex to direct LDL Fasting    Collection Time: 08/06/24 11:23 AM   Result Value Ref Range    Cholesterol 146 <200 mg/dL    Triglycerides 76 <150 mg/dL    Direct Measure HDL 57 >=40 mg/dL    LDL Cholesterol Calculated 74 <=100 mg/dL    Non HDL Cholesterol 89 <130 mg/dL    Patient Fasting > 8hrs? Yes      UNOS cPRA   Date Value Ref Range Status   01/14/2019 0  Final

## 2024-08-06 NOTE — LETTER
2024      Santos Galloway  [address] [id number]      Dear Colleague,    Thank you for referring your patient, Santos Galloway, to the [facility]. Please see a copy of my visit note below.    Transplant Surgery Consult Note     Medical record number: [id number]  YOB: 1970,   Consult requested  for evaluation of kidney transplant candidacy.    Assessment and Recommendations:Mr. Galloway appears to be a good candidate for kidney transplantation and has a good understanding of the risks and benefits of this approach to the management of renal failure. The following issues should be addressed prior to finalizing his transplant candidacy:     Transplant order: Mr. Galloway has End stage renal failure due to diabetes mellitus type 2 and HTN whose condition is not expected to resolve, is expected to progress, and is expected to continue to develop related comorbid conditions.  Recommend he be considered as a candidate for kidney.  Cardiology consult for cardiac risk stratification to be ordered: Yes  CT abdomen and pelvis without contrast to be ordered for assessment of vascular targets: Yes  Transplant listing labs ordered to include HLA, ABOx2, Cr, etc.  Dietician consult ordered: Yes  Social work consult ordered: Yes  Imaging reports reviewed:  yes  Radiology images reviewed:yes  Recipient suitable to move forward with work up of living donors:  Yes    ABO:  O+  Hx of CABG x2 in .  Still on plavix.  Needs cardiac clearance  -needs non contrast CT of abdomen and pelvis  Needs iliac US    The majority of our visit was spent in counselling, discussing the medical and surgical risks of kidney transplantation. We discussed approximate wait time and how that is influenced by issues such as blood type and sensitization (PRA) and access to a living donor. I contrasted potential waiting time for living vs  donor kidneys from  normal (0-85%) or higher (%) kidney  donor profile index (KDPI) donors and their associated outcomes. I would recommend this individual to consider kidneys from high KDPI donors. The reason for this decision is best summarized as: patient strong preference. Potential surgical complications of kidney transplantation include bleeding, superficial or deep wound complications (infection, hernia, lymphocele), ureteral anastomotic failure (leak or stenosis), graft thrombosis, need for reoperation and other issues such as cardiac complications, pneumonia, deep venous thrombosis, pulmonary embolism, post transplant diabetes and death. The potential for recurrent disease or need for retransplantation was also addressed. We discussed the possible need for ureteral stent (and subsequent removal), and the utility of protocol biopsy and laboratory studies to evaluate for rejection or recurrent disease. We discussed the risk of graft rejection, our center's average graft and patient survival rates, immunosuppression protocols, as well as the potential opportunity to participate in clinical trials.  We also discussed the average length of stay, recovery process, and posttransplant lab and monitoring protocol.  I emphasized the need for strict immunosuppression medication adherence and the potential for complications of immunosuppression such as skin cancer or lymphoma, as well as a very low but not zero risk of donor-derived disease transmission risks (infection, cancer). Mr. Galloway asked good questions and his candidacy will be reviewed at our Multidisciplinary Selection Committee. Thank you for the opportunity to participate in Mr. Galloway's care.      Total time: 40 minutes  Counselling time: 30 minutes        ---------------------------------------------------------------------------------------------------    HPI: Mr. Galloway has End stage renal failure due to diabetes mellitus type 2 and HTN. The patient is non-diabetic.       The patient is on dialysis.    Has  potential kidney donors:  No.  Interested in participation in paired exchange if a donor is willing: No    The patient has the following pertinent history:       No    Yes  Dialysis:    []      [x] via:    right upper arm extremity    Blood Transfusion                  []      [x]  Number of units:  unsure Most recently: 2022  Pregnancy:    [x]      [] Number:       Previous Transplant:  [x]      [] Details:    Cancer    [x]      [] Comment:   Kidney stones   [x]      [] Comment:      Recurrent infections  [x]      []  Type:                  Bladder dysfunction  []      [x] Cause:  anruic  Claudication   [x]      [] Distance:    Previous Amputation  [x]      [] Cause:     Chronic anticoagulation  []      [x] Indication: on plavix  Orthodox  [x]      []      Past Medical History:   Diagnosis Date     Anxiety      Cataract      CKD (chronic kidney disease) stage 5, GFR less than 15 ml/min (H)      Gastroparesis      GERD (gastroesophageal reflux disease)      Glaucoma      Hypertension      Peripheral arterial disease (H24)      Type 2 diabetes mellitus (H)      Type 2 diabetes mellitus (H)      Past Surgical History:   Procedure Laterality Date     CIRCUMCISION       CREATE FISTULA ARTERIOVENOUS UPPER EXTREMITY       Family History   Problem Relation Age of Onset     Diabetes Mother      Kidney Disease Mother      Diabetes Maternal Grandmother      Diabetes Maternal Grandfather      Diabetes Paternal Grandmother      Coronary Artery Disease Maternal Uncle      Social History     Socioeconomic History     Marital status:      Spouse name: Not on file     Number of children: Not on file     Years of education: Not on file     Highest education level: Not on file   Occupational History     Not on file   Tobacco Use     Smoking status: Never     Smokeless tobacco: Never   Substance and Sexual Activity     Alcohol use: No     Drug use: No     Sexual activity: Not on file   Other Topics Concern      Parent/sibling w/ CABG, MI or angioplasty before 65F 55M? Not Asked   Social History Narrative     Not on file     Social Determinants of Health     Financial Resource Strain: High Risk (2021)    [facility]    Financial Resource Strain      Difficulty of Paying Living Expenses: Not on file      Difficulty of Paying Living Expenses: Not on file   Food Insecurity: Not on file   Transportation Needs: Not on file   Physical Activity: Not on file   Stress: Not on file   Social Connections: Unknown (2021)    [facility]    Social Connections      Frequency of Communication with Friends and Family: Not on file   Interpersonal Safety: Not on file   Housing Stability: Not on file       ROS:   CONSTITUTIONAL:  No fevers or chills  EYES: negative for icterus  ENT:  negative for hearing loss, tinnitus and sore throat  RESPIRATORY:  negative for cough, sputum, dyspnea  CARDIOVASCULAR:  negative for chest pain. Positive for Fatigue  Renal Insufficiency  GASTROINTESTINAL:  negative for nausea, vomiting, diarrhea or constipation  GENITOURINARY:  negative for incontinence, dysuria, bladder emptying problems  HEME:  No easy bruising  INTEGUMENT:  negative for rash and pruritus  NEURO:  Negative for headache, seizure disorder  Allergies:   No Known Allergies  Medications:  Prescription Medications as of 2024         Rx Number Disp Refills Start End Last Dispensed Date Next Fill Date Owning Pharmacy    calcitRIOL (ROCALTROL) 0.25 MCG capsule  -- -- 2018 --       Class: Historical    calcium carbonate (TUMS) 500 MG chewable tablet  -- -- 2016 --       Si tabs at bedtime    Class: Historical    hydrALAZINE (APRESOLINE) 50 MG tablet  -- -- 2018 --       Sig: Take 50 mg by mouth    Class: Historical    Route: Oral     lisinopril (PRINIVIL/ZESTRIL) 20 MG tablet  -- -- 12/18/2018 --       Class: Historical    omeprazole (PRILOSEC) 20 MG DR capsule  -- -- 12/17/2018 --       Sig: Take 20 mg by mouth    Class: Historical    Route: Oral    sertraline (ZOLOFT) 25 MG tablet  -- -- 12/17/2018 --       Class: Historical    simvastatin (ZOCOR) 20 MG tablet  -- -- 12/17/2018 --       Sig: Take 20 mg by mouth    Class: Historical    Route: Oral    torsemide (DEMADEX) 10 MG tablet  -- -- 5/7/2018 --       Sig: Take 10 mg by mouth    Class: Historical    Route: Oral    vitamin B-12 (CYANOCOBALAMIN) 1000 MCG tablet  -- -- 11/25/2015 --       Sig: Take 1,000 mcg by mouth    Class: Historical    Route: Oral          Exam:     BP (!) 148/74   Pulse 59   Wt 60.8 kg (134 lb)   SpO2 93%   BMI 23.74 kg/m    Appearance: in no apparent distress.   Skin: normal  Eyes:  no redness or discharge.  Sclera anicteric  Head and Neck: Normal, no rashes or jaundice  Respiratory: easy respirations, no audible wheezing.  Abdomen: rounded, Normal bowel sounds   Extremities: femoral 2+/1+, Edema, none  Neuro: without deficit   Psychiatric: Normal mood and affect    Diagnostics:   Recent Results (from the past 672 hour(s))   Echocardiogram Complete    Collection Time: 08/06/24 10:43 AM   Result Value Ref Range    Biplane LVEF 57%    Prostate spec antigen screen [[id number]]    Collection Time: 08/06/24 11:23 AM   Result Value Ref Range    Prostate Specific Antigen Screen 1.07 0.00 - 3.50 ng/mL   Lipid panel reflex to direct LDL Fasting    Collection Time: 08/06/24 11:23 AM   Result Value Ref Range    Cholesterol 146 <200 mg/dL    Triglycerides 76 <150 mg/dL    Direct Measure HDL 57 >=40 mg/dL    LDL Cholesterol Calculated 74 <=100 mg/dL    Non HDL Cholesterol 89 <130 mg/dL    Patient Fasting > 8hrs? Yes      UNOS cPRA   Date Value Ref Range Status   01/14/2019 0  Final          Again, thank you for allowing me to participate in the care of your patient.         Sincerely,        Yazmin Fenton, NP

## 2024-08-06 NOTE — LETTER
2024      Santos Galloway  [address] [facility] [address]      Dear Colleague,    Thank you for referring your patient, Santos Galloway, to the [facility]. Please see a copy of my visit note below.    TRANSPLANT NEPHROLOGY WAITLIST VISIT    Assessment and Plan:  # Kidney Transplant Wait List Evaluation: Patient is a fair candidate overall, still in evaluation phase and needing the following pending items:     Recommendations:  - cardiac risk assessment with left and right heart cath   - noncon MRI abd for indeterminate renal lesions.   - get colonoscopy records  - dental check, may need some teeth extracted     # ESKD from DM II / HTN: doing OK on dialysis since     #Type 2 diabetes: diet controlled since starting dialysis, A1c 5.7%.     # Cardiac Risk: NSTEMI s/p CABG x2 10/2022. 2022 ECHO today with LVEF is 57%. Grade III or advanced diastolic dysfunction and PASP 50-75 mmHg. Needs risk assessment  with left and right heart cath.     # PAD screening: CT and iliacs available for surgery review - showed moderate disease.     # Indeterminate renal lesions on CT: needs noncon MRI abd.     # Former smoker: 10-15 pack-year, quit in 20 + years ago. Denies dyspnea.     # Health Maintenance: Colonoscopy: reportedly up to date - but need records and Dental: Not up to date (may need some teeth extracted)     Discussed the risks and benefits of a transplant, including the risk of surgery and immunosuppression medications.    KDPI: We discussed approximate remaining wait time and how that is influenced by issues such as blood type and sensitization (PRA) and access to a living donor. I contrasted potential waiting time for living vs  donor kidneys from  normal (0-85%) or higher (%) kidney donor profile index (KDPI) donors and their associated outcomes. I would recommend Mr. Galloway to consider kidneys from high KDPI donors. The reason for this decision is best summarized as:  decreased dialysis related morbidity/mortality, accepting lower kidney graft survival rates.    Patient presently appears to be enough of an acceptable kidney transplant recipient candidate to have any potential kidney donors start the evaluation process.  Patient s overall re-evaluation may require further discussion in the Transplant Program s multidisciplinary selection committee for a final recommendation on the patient s suitability for transplant.     Reason for Visit:  Santos Galloway is a 54 year old male with ESKD from DMII / HTN , who presents for kidney transplant wait list evaluation.     Date of Initial Transplant Evaluation:  1/2019         Current Transplant Phase: Waitlist: Inactive  Official UNOS Listing Date: 1/25/2019  Blood Type: O        cPRA: 0%       Date of cPRA: 2019   Transplant Coordinator: Karen Yan Transplant Office phone number [phone]       History of Present Illness:            Interim Events:     - NSTEMI in 7/2019 S/p PCI and started hemodialysis     - admitted with fluid overload issues in 4/2022.     - S/p CABG x 2V in 10/2022.     - admitted with SOB, CT showed LLL atelectasis and small bilateral effusions. He was treated with antibiotics.     - short admission with parainfluenza in 6/2024, Received supportive care and required O2 but imaging without significant lung infiltrate.          Kidney Disease:        Kidney Disease Dx: DM II / HTN        On Dialysis: Yes, Date initiated: 7/2019  and Dialysis Type: [facility] HD;, reports BPs controlled, no longer making urine, AVF aneurysmal on exam. Nausea every now and then.        Primary Nephrologist: Dr. Olivarez         Diabetes: stopped insulin after starting dialysis.        Diabetic Control: Controlled (HbA1c <7%)      Last HbA1c: 5.7%       Hypoglycemic Unawareness: No       New Issues: No       Cardiac/Vascular Disease History:       Known CAD: Yes, NSTEMI s/p CABG x2 10/2022.         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 Lives in a house with his wife. Son lives close by. Able to walk a few blocks OK. Can go up and down stairs at home without chest pains or shortness of breath.     #Identified Care Partner Post Transplant Surgery: wife - not here today.       Allergy Testing Questions:   Medication that caused a reaction None   Antibiotics used that didn't give an allergic reaction?  Patient doesn't know    COVID Vaccination Up To Date: Not asked      Other Pertinent Transplant Surgical Issues:  Recent Blood Transfusion: No  Previous Abdominal Transplant: No  Bladder Dysfunction: No  Chronic/Recurrent Infections: No  Chronic Anticoagulation: No  Jehovah s Witness: No       Active Problem List:   Patient Active Problem List   Diagnosis     Type 2 diabetes mellitus (H)     Hypertension     CKD (chronic kidney disease) stage 5, GFR less than 15 ml/min (H)     Anxiety     Peripheral arterial disease (H24)       Personal History:  Former smoker      Allergies:  No Known Allergies     Medications:  Current Outpatient Medications   Medication Sig Dispense Refill     calcitRIOL (ROCALTROL) 0.25 MCG capsule        calcium carbonate (TUMS) 500 MG chewable tablet 2 tabs at bedtime (Patient not taking: Reported on 1/18/2022)       hydrALAZINE (APRESOLINE) 50 MG tablet Take 50 mg by mouth       lisinopril (PRINIVIL/ZESTRIL) 20 MG tablet        omeprazole (PRILOSEC) 20 MG DR capsule Take 20 mg by mouth       sertraline (ZOLOFT) 25 MG tablet        simvastatin (ZOCOR) 20 MG tablet Take 20 mg by mouth       torsemide (DEMADEX) 10 MG tablet Take 10 mg by mouth       vitamin B-12 (CYANOCOBALAMIN) 1000 MCG tablet Take 1,000 mcg by mouth       No current  facility-administered medications for this visit.        Vitals:  There were no vitals taken for this visit.     Exam:  GENERAL APPEARANCE: alert and no distress  HENT: mouth without ulcers or lesions  LYMPHATICS: no cervical or supraclavicular nodes  RESP: lungs clear to auscultation - no rales, rhonchi or wheezes  CV: regular rhythm, normal rate, no rub, no murmur  EDEMA: no LE edema bilaterally  ABDOMEN: soft, nondistended, nontender, bowel sounds normal  MS: extremities normal - no gross deformities noted, no evidence of inflammation in joints, no muscle tenderness  SKIN: no rash           Again, thank you for allowing me to participate in the care of your patient.        Sincerely,        JOSEPH Abad CNP

## 2024-08-07 ENCOUNTER — VIRTUAL VISIT (OUTPATIENT)
Dept: TRANSPLANT | Facility: CLINIC | Age: 54
End: 2024-08-07
Attending: NURSE PRACTITIONER
Payer: MEDICARE

## 2024-08-07 DIAGNOSIS — Z12.5 PROSTATE CANCER SCREENING: ICD-10-CM

## 2024-08-07 DIAGNOSIS — I25.10 CORONARY ARTERY DISEASE INVOLVING NATIVE CORONARY ARTERY OF NATIVE HEART WITHOUT ANGINA PECTORIS: Primary | ICD-10-CM

## 2024-08-07 DIAGNOSIS — Z76.82 ORGAN TRANSPLANT CANDIDATE: ICD-10-CM

## 2024-08-07 DIAGNOSIS — N18.6 ESRD (END STAGE RENAL DISEASE) (H): ICD-10-CM

## 2024-08-07 NOTE — Clinical Note
8/7/2024      Santos Galloway  [address] [facility] [address]      Dear Colleague,    Thank you for referring your patient, Santos Galloway, to the [facility]. Please see a copy of my visit note below.          [facility]   Cardiology Service  History and Physical      Santos Galloway MRN# [id number]   YOB: 1970 Age: 54 year old     **This visit was conducted virtually.     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 He presents today for cardiovascular risk assessment as part of pre-kidney transplant evaluation.     He is a former smoker; quit 2014 He does not use alcohol or drugs. His activity level is ***. He denies symptoms of chest pain, dizziness, lightheadedness, palpitations, shortness of breath, orthopnea, PND, or edema. He does not have family history of pre-mature heart disease.       Cardiovascular Risk Factor Profile:  {CARDIAC RISKS:542451}    Current Cardiovascular Symptoms:  {DO CARDIAC SYMPTOMS:606923}    ACC HF Stage:  {DO CHF STAGE:456637}  Stage ***    NYHA Class:  {DO NYHA:269477}  Class ***    Functional Capacity:  {Functional Capacity Assessment:959945}        Past Medical History:   Diagnosis Date    Anxiety     Cataract     CKD (chronic kidney disease) stage 5, GFR less than 15 ml/min (H)     Gastroparesis     GERD (gastroesophageal reflux disease)     Glaucoma     Hypertension     Peripheral arterial disease (H)     Type 2 diabetes mellitus (H)     Type 2 diabetes mellitus (H)        Past Surgical History:   Procedure Laterality Date    CIRCUMCISION      CREATE FISTULA ARTERIOVENOUS UPPER EXTREMITY         Current Outpatient Medications   Medication Sig Dispense Refill    calcitRIOL (ROCALTROL) 0.25 MCG capsule       calcium carbonate (TUMS) 500 MG chewable tablet 2 tabs at bedtime  (Patient not taking: Reported on 1/18/2022)      hydrALAZINE (APRESOLINE) 50 MG tablet Take 50 mg by mouth      lisinopril (PRINIVIL/ZESTRIL) 20 MG tablet       omeprazole (PRILOSEC) 20 MG DR capsule Take 20 mg by mouth      sertraline (ZOLOFT) 25 MG tablet       simvastatin (ZOCOR) 20 MG tablet Take 20 mg by mouth      torsemide (DEMADEX) 10 MG tablet Take 10 mg by mouth      vitamin B-12 (CYANOCOBALAMIN) 1000 MCG tablet Take 1,000 mcg by mouth       No current facility-administered medications for this visit.       Family History   Problem Relation Age of Onset    Diabetes Mother     Kidney Disease Mother     Diabetes Maternal Grandmother     Diabetes Maternal Grandfather     Diabetes Paternal Grandmother     Coronary Artery Disease Maternal Uncle        Social History     Tobacco Use    Smoking status: Never    Smokeless tobacco: Never   Substance Use Topics    Alcohol use: No       No Known Allergies      Review Of Systems:   CONSTITUTIONAL: No report of fevers or chills  RESPIRATORY: No cough, wheezing, SOB, or hemoptysis  CARDIOVASCULAR: see HPI  MUSCULO-SKELETAL: No joint pain or swelling, no muscle pain  NEURO: No paresthesias, syncope, pre-syncope, lightheadness, dizziness or vertigo  ENDOCRINE: No temperature intolerance, no skin/hair changes  PSYCHIATRIC: No change in mood, feeling down/anxious, no change in sleep or appetite  GI: No melena or hematochezia, no change in bowel habits  : No hematuria or dysuria, no hesitancy, dribbling or incontinence. Still making urine: ***  HEME: No easy bruising or bleeding, no history of anemia, no history of blood clots  SKIN: No rashes or sores, no unusual itching      Physical Examination:  ** No physical exam performed due to visit being virtual.       Laboratory:  Last Comprehensive Metabolic Panel:  Lab Results   Component Value Date     01/14/2019    POTASSIUM 4.3 01/14/2019    CHLORIDE 114 (H) 01/14/2019    CO2 18 (L) 01/14/2019    ANIONGAP 8 01/14/2019      (H) 01/14/2019    BUN 55 (H) 01/14/2019    CR 5.14 (H) 01/14/2019    GFRESTIMATED 12 (L) 01/14/2019    JULIA 6.3 (L) 01/14/2019       Last CBC:  CBC RESULTS:   Recent Labs   Lab Test 01/14/19  1402   WBC 6.7   RBC 3.66*   HGB 9.8*   HCT 32.2*   MCV 88   MCH 26.8   MCHC 30.4*   RDW 14.4          8/7/24 EKG:   ***    8/6/24 Echocardiogram:  Biplane LVEF is 57%. Grade III or advanced diastolic dysfunction.  Borderline right ventricular enlargement. Global right ventricular function is  normal.  Severe biatrial enlargement is present.  Moderate tricuspid insufficiency is present.  The right ventricular systolic pressure is 48mmHg above the right atrial  pressure.  IVC diameter <2.1 cm collapsing >50% with sniff suggests a normal RA pressure  of 3 mmHg.  Moderate (pulmonary artery systolic pressure 50-75mmHg) pulmonary hypertension  is present.  No pericardial effusion is present.     This study was compared with the study from 1/14/2019, LV diastolic function  has worsened.    Assessment and Plan:     # Coronary Artery Disease   # Hyperlipidemia   # Hx of CABG x 2 (***)  Severe coronary calcifications noted on chest CT in 10/2023. He was last evaluated by Dr. Hamilton in January of 2022 at which time a coronary angiogram was recommended but not completed. Last lipid panel from 8/6/24 with total cholesterol 146, HDL 57, and LDL 74. He is on statin therapy.  His risk factors include age, remote history of smoking, hx of HLD, DM and dialysis vintages. He denies chest pain, dizziness, lightheadedness, shortness of breath, orthopnea, edema, or PND. ***  - Continue Simvastatin 20mg q HS***  - Continue Aspirin 81mg daily ***  - Ischemic evaluation with coronary angiogram (and RHC; see below)  - Follow-up with me 2 weeks after angiogram     # Moderate Pulmonary Hypertension  # Grade III Diastolic Dysfunction   # Moderate Tricuspid Insufficiency   Most recent echocardiogram done 8/6/24 showing RVSP of 48,  moderate pHTN, borderline RV enlargement, and grade III diastolic dysfunction.   - Right heart catheterization  - Consultation in pHTN clinic after RHC    # Hypertension  Blood pressures on home average ***.   - Continue Hydralazine 75mg TID ***  - Continue Lisinopril 20mg daily ***  - Continue Amlodipine 10mg daily ***  - Continue Toprol XL 50mg daily ***  - Monitor BP's at home. Keep a log of readings and bring to follow up appointments  - BP goal < 130/80    # Diabetes Mellitus Type 2  # Gastroparesis  Diagnosed over 20 years ago. Most recent A1c per chart review 5.7% in November 2023. Home regimen includes *** Blood sugars at home average ***. He denies*** hyper/hypoglycemic unawareness.   - Repeat A1c ***  - Educated on importance of diabetic control in preventing progression of heart and kidney disease  - ***    # Overweight (BMI 28 ***)   Per patient statement weight has been *** over the past year. Denies/endorses*** recent weight loss.   - Educated patient on the importance of healthy diet and exercise in reducing risk for heart disease   - Encouraged 150 minutes/week of moderate intensity exercise   - Encouraged healthy weight loss; discussed Mediterranean diet     # ESRD on HD   He is currently on dialysis. He admits to being compliant with all medications and does not miss HD runs.   - Additional follow-up / testing per SOT team  - Continue Torsemide 10mg daily ***      CAD Risk Level:  High Risk: > 3 risk factors, OR diabetic kidney disease, OR DM > 10 years, known CAD or PAD, prior PCI or CABG, on dialysis 5 years or more     RCRI Score:   - High risk surgery (>5% cardiac complication risk) = 1 point   - Serum Creatinine >2.0 mg/dl = 1 point          I have reviewed and updated the patient's Past Medical History, Social History, Family History and Medication List.         Reina Stapleton, APRN, CNP  [facility] Cardiology      Review of prior external note(s) from - care everywhere,  SOT, nephrology  Review  "of the result(s) of each unique test - Echocardiogram, EKG, CT  Ordering of each unique test  Prescription drug management- medication reconciliation    *** minutes spent by me on the date of the encounter doing chart review, review of outside records, review of test results, patient visit, and documentation     SIGN***      Video-Visit Details    Type of service:  Video Visit   Video Start Time: {video visit start/end time for provider to select:786871}  Video End Time:{video visit start/end time for provider to select:638966}    Originating Location (pt. Location): {video visit patient location:302062::\"Home\"}  Distant Location (provider location):  {virtual location provider:638973}  Platform used for Video Visit: {Virtual Visit Platforms:829692::\"[employer] TV\"}        Again, thank you for allowing me to participate in the care of your patient.        Sincerely,        JOSEPH Stevenson CNP  "

## 2024-08-09 DIAGNOSIS — N28.9 RENAL LESION: Primary | ICD-10-CM

## 2024-08-09 NOTE — PROGRESS NOTES
Called pt to let him know he will need an abdominal MRI to follow up on renal lesions seen on recent CT a/p. Pt will also need to reschedule cardiology- sent inbskt to schedulers to set up. Pt verbalized understanding of information and has no further questions. Encouraged to reach out if questions arise.

## 2024-08-12 ENCOUNTER — TELEPHONE (OUTPATIENT)
Dept: TRANSPLANT | Facility: CLINIC | Age: 54
End: 2024-08-12
Payer: MEDICARE

## 2024-08-12 LAB
PROTOCOL CUTOFF: NORMAL
SA 1  COMMENTS: NORMAL
SA 1 CELL: NORMAL
SA 1 TEST METHOD: NORMAL
SA 2 CELL: NORMAL
SA 2 COMMENTS: NORMAL
SA 2 TEST METHOD: NORMAL
SA1 HI RISK ABY: NORMAL
SA1 MOD RISK ABY: NORMAL
SA2 HI RISK ABY: NORMAL
SA2 MOD RISK ABY: NORMAL
UNACCEPTABLE ANTIGENS: NORMAL
UNOS CPRA: 0

## 2024-08-12 NOTE — TELEPHONE ENCOUNTER
Called pt to update that angiogram and RHC is needed for transplant work up. He also needs to be seen by pulmonary hypertension group. Pt states he is unsure if he would like to schedule these at this time, his son is having a baby next month and is his only ride to visit. He would like to check in around November and will decide if he is ready to move forward with transplant work up at that time. Pt verbalized understanding of information and has no further questions. Encouraged to reach out if questions arise.

## 2024-08-13 ENCOUNTER — TEAM CONFERENCE (OUTPATIENT)
Dept: TRANSPLANT | Facility: CLINIC | Age: 54
End: 2024-08-13
Payer: MEDICARE

## 2024-08-13 NOTE — TELEPHONE ENCOUNTER
Image Review Meeting    ATTENDEES: Dr. Chong and coordinators     IMAGES REVIEWED: 8/6/24 CT a/p and iliac US    DECISION: vessels suitable for transplant. MRI ordered for hyperattenuating cortical lesion. Nothing further on the rest of the impression.     INCIDENTALS: Yes:   IMPRESSION:   1. Moderate aortoiliac atherosclerotic calcification, including  circumferential calcification of the external iliac arteries without  aneurysmal dilation.   2. Atrophic kidneys with several intermediate to hyperattenuating  cortical lesions favored to represent hemorrhagic/proteinaceous cyst  but technically indeterminate on this noncontrast CT. Consider renal  ultrasound as an initial assessment for any potential solid lesion.  Ultimately an MRI would be the best study to further characterize  these lesions, ideally with contrast, though a noncontrast study may  be sufficient. MRI ordered   3. Cardiomegaly with small to moderate right and trace left pleural  effusions and adjacent compressive atelectasis as well as findings of  pulmonary edema in the lung bases.   4. Small volume ascites.  5. Cholelithiasis. Mild gallbladder wall thickening and/or  pericholecystic fluid is favored to be congestive in the setting of  cardiomegaly.  6. 3 mm stone in the bladder near the right ureterovesical junction.

## 2024-08-27 NOTE — PROGRESS NOTES
[facility]   Cardiology Service  History and Physical      Santos Galloway MRN# [id number]   YOB: 1970 Age: 54 year old     **This visit was conducted virtually.     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 He presents today for cardiovascular risk assessment as part of pre-kidney transplant evaluation.     He is a former smoker; quit 2014 He does not use alcohol or drugs. His activity level is ***. He denies symptoms of chest pain, dizziness, lightheadedness, palpitations, shortness of breath, orthopnea, PND, or edema. He does not have family history of pre-mature heart disease.       Cardiovascular Risk Factor Profile:  coronary artery disease, hypertension, diabetes, male age >50, previous smoker, sedentary lifestyle,*** high cholesterol (LDL), and dialysis > 5 years     Current Cardiovascular Symptoms:  {DO CARDIAC SYMPTOMS:205712}    ACC HF Stage:  Stage B    NYHA Class:  Class I/II***    Functional Capacity:  {Functional Capacity Assessment:505309}        Past Medical History:   Diagnosis Date    Anxiety     Cataract     CKD (chronic kidney disease) stage 5, GFR less than 15 ml/min (H)     Gastroparesis     GERD (gastroesophageal reflux disease)     Glaucoma     Hypertension     Peripheral arterial disease (H)     Type 2 diabetes mellitus (H)     Type 2 diabetes mellitus (H)        Past Surgical History:   Procedure Laterality Date    CIRCUMCISION      CREATE FISTULA ARTERIOVENOUS UPPER EXTREMITY         Current Outpatient Medications   Medication Sig Dispense Refill    calcitRIOL (ROCALTROL) 0.25 MCG capsule       calcium carbonate (TUMS) 500 MG chewable tablet 2 tabs at bedtime (Patient not taking: Reported on 1/18/2022)      hydrALAZINE (APRESOLINE) 50 MG tablet Take 50 mg by mouth      lisinopril (PRINIVIL/ZESTRIL) 20  MG tablet       omeprazole (PRILOSEC) 20 MG DR capsule Take 20 mg by mouth      sertraline (ZOLOFT) 25 MG tablet       simvastatin (ZOCOR) 20 MG tablet Take 20 mg by mouth      torsemide (DEMADEX) 10 MG tablet Take 10 mg by mouth      vitamin B-12 (CYANOCOBALAMIN) 1000 MCG tablet Take 1,000 mcg by mouth       No current facility-administered medications for this visit.       Family History   Problem Relation Age of Onset    Diabetes Mother     Kidney Disease Mother     Diabetes Maternal Grandmother     Diabetes Maternal Grandfather     Diabetes Paternal Grandmother     Coronary Artery Disease Maternal Uncle        Social History     Tobacco Use    Smoking status: Never    Smokeless tobacco: Never   Substance Use Topics    Alcohol use: No       No Known Allergies      Review Of Systems:   CONSTITUTIONAL: No report of fevers or chills  RESPIRATORY: No cough, wheezing, SOB, or hemoptysis  CARDIOVASCULAR: see HPI  MUSCULO-SKELETAL: No joint pain or swelling, no muscle pain  NEURO: No paresthesias, syncope, pre-syncope, lightheadness, dizziness or vertigo  ENDOCRINE: No temperature intolerance, no skin/hair changes  PSYCHIATRIC: No change in mood, feeling down/anxious, no change in sleep or appetite  GI: No melena or hematochezia, no change in bowel habits  : No hematuria or dysuria, no hesitancy, dribbling or incontinence. Still making urine: ***  HEME: No easy bruising or bleeding, no history of anemia, no history of blood clots  SKIN: No rashes or sores, no unusual itching      Physical Examination:  ** No physical exam performed due to visit being virtual.       Laboratory:  Last Comprehensive Metabolic Panel:  Lab Results   Component Value Date     01/14/2019    POTASSIUM 4.3 01/14/2019    CHLORIDE 114 (H) 01/14/2019    CO2 18 (L) 01/14/2019    ANIONGAP 8 01/14/2019     (H) 01/14/2019    BUN 55 (H) 01/14/2019    CR 5.14 (H) 01/14/2019    GFRESTIMATED 12 (L) 01/14/2019    JULIA 6.3 (L) 01/14/2019        Last CBC:  CBC RESULTS:   Recent Labs   Lab Test 01/14/19  1402   WBC 6.7   RBC 3.66*   HGB 9.8*   HCT 32.2*   MCV 88   MCH 26.8   MCHC 30.4*   RDW 14.4          EKG:   ***    8/6/24 Echocardiogram:  Biplane LVEF is 57%. Grade III or advanced diastolic dysfunction.  Borderline right ventricular enlargement. Global right ventricular function is  normal.  Severe biatrial enlargement is present.  Moderate tricuspid insufficiency is present.  The right ventricular systolic pressure is 48mmHg above the right atrial  pressure.  IVC diameter <2.1 cm collapsing >50% with sniff suggests a normal RA pressure  of 3 mmHg.  Moderate (pulmonary artery systolic pressure 50-75mmHg) pulmonary hypertension  is present.  No pericardial effusion is present.     This study was compared with the study from 1/14/2019, LV diastolic function  has worsened.    Assessment and Plan:     # Coronary Artery Disease   # Hyperlipidemia   # Hx of CABG x 2 (***)  Severe coronary calcifications noted on chest CT in 10/2023. He was last evaluated by Dr. Hmailton in January of 2022 at which time a coronary angiogram was recommended but not completed. Last lipid panel from 8/6/24 with total cholesterol 146, HDL 57, and LDL 74. He is on statin therapy.  His risk factors include age, remote history of smoking, hx of HLD, DM and dialysis vintages. He denies chest pain, dizziness, lightheadedness, shortness of breath, orthopnea, edema, or PND. ***  - Continue Simvastatin 20mg q HS***  - Continue Aspirin 81mg daily ***  - Ischemic evaluation with coronary angiogram (and [facility]; see below)  - Follow-up with me 2 weeks after angiogram     # Moderate Pulmonary Hypertension  # Grade III Diastolic Dysfunction   # Moderate Tricuspid Insufficiency   Most recent echocardiogram done 8/6/24 showing RVSP of 48, moderate pHTN, borderline RV enlargement, and grade III diastolic dysfunction.   - Right heart catheterization  - Consultation in pHTN clinic after  [facility]    # Hypertension  Blood pressures on home average ***.   - Continue Hydralazine 75mg TID ***  - Continue Lisinopril 20mg daily ***  - Continue Amlodipine 10mg daily ***  - Continue Toprol XL 50mg daily ***  - Monitor BP's at home. Keep a log of readings and bring to follow up appointments  - BP goal < 130/80    # Diabetes Mellitus Type 2  # Gastroparesis  Diagnosed over 20 years ago. Most recent A1c per chart review 5.7% in November 2023. Home regimen includes *** Blood sugars at home average ***. He denies*** hyper/hypoglycemic unawareness.   - Repeat A1c ***  - Educated on importance of diabetic control in preventing progression of heart and kidney disease  - ***    # Overweight (BMI 28 ***)   Per patient statement weight has been *** over the past year. Denies/endorses*** recent weight loss.   - Educated patient on the importance of healthy diet and exercise in reducing risk for heart disease   - Encouraged 150 minutes/week of moderate intensity exercise   - Encouraged healthy weight loss; discussed Mediterranean diet     # ESRD on HD   He is currently on dialysis. He admits to being compliant with all medications and does not miss HD runs.   - Additional follow-up / testing per SOT team  - Continue Torsemide 10mg daily ***      CAD Risk Level:  High Risk: > 3 risk factors, OR diabetic kidney disease, OR DM > 10 years, known CAD or PAD, prior PCI or CABG, on dialysis 5 years or more     RCRI Score:   - High risk surgery (>5% cardiac complication risk) = 1 point   - Serum Creatinine >2.0 mg/dl = 1 point          I have reviewed and updated the patient's Past Medical History, Social History, Family History and Medication List.         Reina Stapleton, APRN, CNP  [facility] Cardiology      Review of prior external note(s) from - care everywhere,  SOT, nephrology  Review of the result(s) of each unique test - Echocardiogram, EKG, CT  Ordering of each unique test  Prescription drug management- medication  "reconciliation    *** minutes spent by me on the date of the encounter doing chart review, review of outside records, review of test results, patient visit, and documentation     SIGN***      Video-Visit Details    Type of service:  Video Visit   Video Start Time: {video visit start/end time for provider to select:105782}  Video End Time:{video visit start/end time for provider to select:106990}    Originating Location (pt. Location): {video visit patient location:571200::\"Home\"}  Distant Location (provider location):  {virtual location provider:276235}  Platform used for Video Visit: {Virtual Visit Platforms:060763::\"e(ye)BRAIN\"}    "

## 2024-09-05 NOTE — PATIENT INSTRUCTIONS
You were seen in the cardiology clinic by: Reina Stapleton CNP    Plan:     Medication Changes:   - ***    Labs/Tests Needed:   - You will be contacted to schedule a coronary angiogram to look for blockages in your heart, and a right heart catheterization to measure the pressures in your heart.     Follow Up Visit:   - Follow-up with me 2 weeks after the angiogram and right heart catheterization.     General Recommendations/Guidelines:  - ***  - Monitor your blood pressures at home. Please keep a log of the readings and bring to your future nephrology or cardiology appointments. Your blood pressure goal is < 130/80. It is important to achieve adequate blood pressure control before transplant.   - Focus on a low sodium diet. You should aim to consume <2,000mg of sodium daily.   - I encourage you to achieve 150 minutes/week of moderate intensity exercise if able. If this is not achievable right away, you can gradually work yourself up to this starting with low intensity exercises (i.e. walking or swimming) a couple days a week. No swimming if on peritoneal dialysis.**      Important Numbers:     Cardiology   Telephone Number     To schedule an appointment or leave a message for your care team:   [phone]      Press #1   To reach the billing department: [phone]      Press # 3     To obtain copies of your medical records: [phone]      Press # 4   To reach the on-call cardiologist for after hours or on weekends: [phone]     Option #4, and ask to speak to the      Cardiovascular Clinic:   [address] [place] [address]  [phone]      Thank you for trusting us with your health care needs!

## 2024-09-25 ENCOUNTER — APPOINTMENT (OUTPATIENT)
Dept: TRANSPLANT | Facility: CLINIC | Age: 54
End: 2024-09-25
Payer: MEDICARE

## 2024-09-25 DIAGNOSIS — I27.20 PULMONARY HYPERTENSION (H): Primary | ICD-10-CM

## 2024-11-13 ENCOUNTER — TELEPHONE (OUTPATIENT)
Dept: TRANSPLANT | Facility: CLINIC | Age: 54
End: 2024-11-13
Payer: MEDICARE

## 2024-11-13 NOTE — TELEPHONE ENCOUNTER
Called pt to check in if ready to complete renal MRI and cards work up. Left VM with direct line for return call.

## 2024-12-12 ENCOUNTER — TELEPHONE (OUTPATIENT)
Dept: TRANSPLANT | Facility: CLINIC | Age: 54
End: 2024-12-12
Payer: MEDICARE

## 2024-12-12 NOTE — TELEPHONE ENCOUNTER
Called pt to check in. Things are going good. He would like to wait to move forward with anything until after the holidays at this time. Pt requesting call back for check in after January. Will check in after the first of the year. Pt verbalized understanding of information and has no further questions. Encouraged to reach out if questions arise.

## 2025-01-06 ENCOUNTER — TELEPHONE (OUTPATIENT)
Dept: TRANSPLANT | Facility: CLINIC | Age: 55
End: 2025-01-06
Payer: MEDICARE

## 2025-02-24 ENCOUNTER — TELEPHONE (OUTPATIENT)
Dept: TRANSPLANT | Facility: CLINIC | Age: 55
End: 2025-02-24
Payer: MEDICARE

## 2025-02-24 NOTE — TELEPHONE ENCOUNTER
Attempted to call pt to check in. Phone went silent and call was disconnected.     Email sent to pt to check in.

## 2025-03-24 ENCOUNTER — TELEPHONE (OUTPATIENT)
Dept: TRANSPLANT | Facility: CLINIC | Age: 55
End: 2025-03-24
Payer: MEDICARE

## 2025-03-24 NOTE — TELEPHONE ENCOUNTER
Called pt to check in if ready to move forward with transplant work up. Needs: Cards w/ [facility] and [facility] as well as seeing pulm HTN group. Also needs renal MRI. Pt is currently admitted to the hospital with pneumonia and on oxygen. Pt will call writer when he is discharged. Email sent to pt with direct line. Pt verbalized understanding of information and has no further questions. Encouraged to reach out if questions arise.

## 2025-06-16 ENCOUNTER — TELEPHONE (OUTPATIENT)
Dept: TRANSPLANT | Facility: CLINIC | Age: 55
End: 2025-06-16
Payer: MEDICARE

## 2025-06-16 NOTE — TELEPHONE ENCOUNTER
Called pt to check in, have not received call back from 3/2025 encounter. Pt is doing much better, no longer on oxygen. Reports he has been seen by pulmonary but cannot recall when. Pt to verify when/ where he was seen so records can be requested. Pt will call writer back with information. Pt does not want to move forward with cards and MRI at this time. His son is his  for appts and is in a busy season for work. He would like a check in at the end of summer (around August) to see if he is ready to complete work up. Pt aware to contact coordinator if he would like to move forward sooner. Pt verbalized understanding of information and has no further questions. Encouraged to reach out if questions arise.